# Patient Record
Sex: FEMALE | Race: ASIAN | NOT HISPANIC OR LATINO | Employment: OTHER | URBAN - METROPOLITAN AREA
[De-identification: names, ages, dates, MRNs, and addresses within clinical notes are randomized per-mention and may not be internally consistent; named-entity substitution may affect disease eponyms.]

---

## 2018-09-24 LAB
HBV SURFACE AG SERPL QL IA: NONREACTIVE
HIV 1+2 AB+HIV1 P24 AG SERPL QL IA: NEGATIVE
RUBELLA ANTIBODY IGG QUANTITATIVE: NORMAL IU/ML

## 2019-03-17 ENCOUNTER — APPOINTMENT (OUTPATIENT)
Dept: ULTRASOUND IMAGING | Facility: CLINIC | Age: 34
End: 2019-03-17
Attending: OBSTETRICS & GYNECOLOGY
Payer: COMMERCIAL

## 2019-03-17 ENCOUNTER — ANESTHESIA EVENT (OUTPATIENT)
Dept: SURGERY | Facility: CLINIC | Age: 34
End: 2019-03-17
Payer: COMMERCIAL

## 2019-03-17 ENCOUNTER — HOSPITAL ENCOUNTER (INPATIENT)
Facility: CLINIC | Age: 34
LOS: 3 days | Discharge: HOME | End: 2019-03-20
Attending: FAMILY MEDICINE | Admitting: OBSTETRICS & GYNECOLOGY
Payer: COMMERCIAL

## 2019-03-17 ENCOUNTER — ANESTHESIA (OUTPATIENT)
Dept: OBGYN | Facility: CLINIC | Age: 34
End: 2019-03-17

## 2019-03-17 ENCOUNTER — SURGERY (OUTPATIENT)
Age: 34
End: 2019-03-17
Payer: COMMERCIAL

## 2019-03-17 ENCOUNTER — ANESTHESIA EVENT (OUTPATIENT)
Dept: OBGYN | Facility: CLINIC | Age: 34
End: 2019-03-17

## 2019-03-17 ENCOUNTER — ANESTHESIA (OUTPATIENT)
Dept: SURGERY | Facility: CLINIC | Age: 34
End: 2019-03-17
Payer: COMMERCIAL

## 2019-03-17 ENCOUNTER — APPOINTMENT (OUTPATIENT)
Dept: MRI IMAGING | Facility: CLINIC | Age: 34
End: 2019-03-17
Attending: OBSTETRICS & GYNECOLOGY
Payer: COMMERCIAL

## 2019-03-17 DIAGNOSIS — Z98.891 S/P C-SECTION: ICD-10-CM

## 2019-03-17 DIAGNOSIS — N30.00 ACUTE CYSTITIS WITHOUT HEMATURIA: Primary | ICD-10-CM

## 2019-03-17 LAB
ABO + RH BLD: NORMAL
ABO + RH BLD: NORMAL
ALBUMIN SERPL-MCNC: 2.7 G/DL (ref 3.4–5)
ALBUMIN UR-MCNC: NEGATIVE MG/DL
ALP SERPL-CCNC: 127 U/L (ref 40–150)
ALT SERPL W P-5'-P-CCNC: 13 U/L (ref 0–50)
AMPHETAMINES UR QL SCN: NEGATIVE
ANION GAP SERPL CALCULATED.3IONS-SCNC: 10 MMOL/L (ref 3–14)
APPEARANCE UR: ABNORMAL
AST SERPL W P-5'-P-CCNC: 16 U/L (ref 0–45)
BACTERIA #/AREA URNS HPF: ABNORMAL /HPF
BILIRUB SERPL-MCNC: 0.3 MG/DL (ref 0.2–1.3)
BILIRUB UR QL STRIP: NEGATIVE
BLD GP AB SCN SERPL QL: NORMAL
BLD PROD TYP BPU: NORMAL
BLOOD BANK CMNT PATIENT-IMP: NORMAL
BUN SERPL-MCNC: 6 MG/DL (ref 7–30)
CALCIUM SERPL-MCNC: 8.5 MG/DL (ref 8.5–10.1)
CANNABINOIDS UR QL: NEGATIVE
CHLORIDE SERPL-SCNC: 107 MMOL/L (ref 94–109)
CO2 SERPL-SCNC: 21 MMOL/L (ref 20–32)
COCAINE UR QL: NEGATIVE
COLOR UR AUTO: YELLOW
CREAT SERPL-MCNC: 0.57 MG/DL (ref 0.52–1.04)
CREAT UR-MCNC: 53 MG/DL
ERYTHROCYTE [DISTWIDTH] IN BLOOD BY AUTOMATED COUNT: 15.4 % (ref 10–15)
GFR SERPL CREATININE-BSD FRML MDRD: >90 ML/MIN/{1.73_M2}
GLUCOSE BLDC GLUCOMTR-MCNC: 143 MG/DL (ref 70–99)
GLUCOSE SERPL-MCNC: 64 MG/DL (ref 70–99)
GLUCOSE UR STRIP-MCNC: NEGATIVE MG/DL
HCT VFR BLD AUTO: 32.8 % (ref 35–47)
HGB BLD-MCNC: 10.5 G/DL (ref 11.7–15.7)
HGB UR QL STRIP: NEGATIVE
KETONES UR STRIP-MCNC: 5 MG/DL
LEUKOCYTE ESTERASE UR QL STRIP: ABNORMAL
LIPASE SERPL-CCNC: 185 U/L (ref 73–393)
MCH RBC QN AUTO: 28.8 PG (ref 26.5–33)
MCHC RBC AUTO-ENTMCNC: 32 G/DL (ref 31.5–36.5)
MCV RBC AUTO: 90 FL (ref 78–100)
MUCOUS THREADS #/AREA URNS LPF: PRESENT /LPF
NITRATE UR QL: NEGATIVE
NUM BPU REQUESTED: 2
OPIATES UR QL SCN: NEGATIVE
PCP UR QL SCN: NEGATIVE
PH UR STRIP: 6 PH (ref 5–7)
PLATELET # BLD AUTO: 281 10E9/L (ref 150–450)
POTASSIUM SERPL-SCNC: 3.9 MMOL/L (ref 3.4–5.3)
PROT SERPL-MCNC: 7.3 G/DL (ref 6.8–8.8)
PROT UR-MCNC: 0.21 G/L
PROT/CREAT 24H UR: 0.39 G/G CR (ref 0–0.2)
RBC # BLD AUTO: 3.65 10E12/L (ref 3.8–5.2)
RBC #/AREA URNS AUTO: 2 /HPF (ref 0–2)
SODIUM SERPL-SCNC: 138 MMOL/L (ref 133–144)
SOURCE: ABNORMAL
SP GR UR STRIP: 1.01 (ref 1–1.03)
SPECIMEN EXP DATE BLD: NORMAL
SQUAMOUS #/AREA URNS AUTO: 4 /HPF (ref 0–1)
T PALLIDUM AB SER QL: NONREACTIVE
URATE SERPL-MCNC: 4.5 MG/DL (ref 2.6–6)
UROBILINOGEN UR STRIP-MCNC: 0 MG/DL (ref 0–2)
WBC # BLD AUTO: 12.6 10E9/L (ref 4–11)
WBC #/AREA URNS AUTO: 6 /HPF (ref 0–5)

## 2019-03-17 PROCEDURE — G0463 HOSPITAL OUTPT CLINIC VISIT: HCPCS

## 2019-03-17 PROCEDURE — 86850 RBC ANTIBODY SCREEN: CPT | Performed by: FAMILY MEDICINE

## 2019-03-17 PROCEDURE — 83690 ASSAY OF LIPASE: CPT | Performed by: OBSTETRICS & GYNECOLOGY

## 2019-03-17 PROCEDURE — 81001 URINALYSIS AUTO W/SCOPE: CPT | Performed by: FAMILY MEDICINE

## 2019-03-17 PROCEDURE — 87086 URINE CULTURE/COLONY COUNT: CPT | Performed by: OBSTETRICS & GYNECOLOGY

## 2019-03-17 PROCEDURE — 86901 BLOOD TYPING SEROLOGIC RH(D): CPT | Performed by: FAMILY MEDICINE

## 2019-03-17 PROCEDURE — 76819 FETAL BIOPHYS PROFIL W/O NST: CPT

## 2019-03-17 PROCEDURE — 0064U ANTB TP TOTAL&RPR IA QUAL: CPT | Performed by: OBSTETRICS & GYNECOLOGY

## 2019-03-17 PROCEDURE — 76816 OB US FOLLOW-UP PER FETUS: CPT

## 2019-03-17 PROCEDURE — 58611 LIGATE OVIDUCT(S) ADD-ON: CPT | Performed by: OBSTETRICS & GYNECOLOGY

## 2019-03-17 PROCEDURE — 88307 TISSUE EXAM BY PATHOLOGIST: CPT | Performed by: OBSTETRICS & GYNECOLOGY

## 2019-03-17 PROCEDURE — 0UT50ZZ RESECTION OF RIGHT FALLOPIAN TUBE, OPEN APPROACH: ICD-10-PCS | Performed by: OBSTETRICS & GYNECOLOGY

## 2019-03-17 PROCEDURE — 86923 COMPATIBILITY TEST ELECTRIC: CPT | Performed by: FAMILY MEDICINE

## 2019-03-17 PROCEDURE — 25000128 H RX IP 250 OP 636: Performed by: FAMILY MEDICINE

## 2019-03-17 PROCEDURE — 59514 CESAREAN DELIVERY ONLY: CPT | Performed by: OBSTETRICS & GYNECOLOGY

## 2019-03-17 PROCEDURE — 25000128 H RX IP 250 OP 636: Performed by: OBSTETRICS & GYNECOLOGY

## 2019-03-17 PROCEDURE — 86900 BLOOD TYPING SEROLOGIC ABO: CPT | Performed by: FAMILY MEDICINE

## 2019-03-17 PROCEDURE — 37000008 ZZH ANESTHESIA TECHNICAL FEE, 1ST 30 MIN: Performed by: OBSTETRICS & GYNECOLOGY

## 2019-03-17 PROCEDURE — 71000014 ZZH RECOVERY PHASE 1 LEVEL 2 FIRST HR: Performed by: OBSTETRICS & GYNECOLOGY

## 2019-03-17 PROCEDURE — 36000058 ZZH SURGERY LEVEL 3 EA 15 ADDTL MIN: Performed by: OBSTETRICS & GYNECOLOGY

## 2019-03-17 PROCEDURE — 12000000 ZZH R&B MED SURG/OB

## 2019-03-17 PROCEDURE — 84156 ASSAY OF PROTEIN URINE: CPT | Performed by: FAMILY MEDICINE

## 2019-03-17 PROCEDURE — 76700 US EXAM ABDOM COMPLETE: CPT

## 2019-03-17 PROCEDURE — 0UN90ZZ RELEASE UTERUS, OPEN APPROACH: ICD-10-PCS | Performed by: OBSTETRICS & GYNECOLOGY

## 2019-03-17 PROCEDURE — 25000128 H RX IP 250 OP 636

## 2019-03-17 PROCEDURE — 87088 URINE BACTERIA CULTURE: CPT | Performed by: OBSTETRICS & GYNECOLOGY

## 2019-03-17 PROCEDURE — 88302 TISSUE EXAM BY PATHOLOGIST: CPT | Performed by: OBSTETRICS & GYNECOLOGY

## 2019-03-17 PROCEDURE — 80053 COMPREHEN METABOLIC PANEL: CPT | Performed by: OBSTETRICS & GYNECOLOGY

## 2019-03-17 PROCEDURE — 88307 TISSUE EXAM BY PATHOLOGIST: CPT | Mod: 26 | Performed by: OBSTETRICS & GYNECOLOGY

## 2019-03-17 PROCEDURE — 88302 TISSUE EXAM BY PATHOLOGIST: CPT | Mod: 26 | Performed by: OBSTETRICS & GYNECOLOGY

## 2019-03-17 PROCEDURE — 00000146 ZZHCL STATISTIC GLUCOSE BY METER IP

## 2019-03-17 PROCEDURE — 25000128 H RX IP 250 OP 636: Performed by: NURSE ANESTHETIST, CERTIFIED REGISTERED

## 2019-03-17 PROCEDURE — 84550 ASSAY OF BLOOD/URIC ACID: CPT | Performed by: OBSTETRICS & GYNECOLOGY

## 2019-03-17 PROCEDURE — 59514 CESAREAN DELIVERY ONLY: CPT | Mod: 80 | Performed by: OBSTETRICS & GYNECOLOGY

## 2019-03-17 PROCEDURE — 25000132 ZZH RX MED GY IP 250 OP 250 PS 637: Performed by: OBSTETRICS & GYNECOLOGY

## 2019-03-17 PROCEDURE — 74181 MRI ABDOMEN W/O CONTRAST: CPT

## 2019-03-17 PROCEDURE — 58611 LIGATE OVIDUCT(S) ADD-ON: CPT | Mod: 80 | Performed by: OBSTETRICS & GYNECOLOGY

## 2019-03-17 PROCEDURE — 0UB60ZZ EXCISION OF LEFT FALLOPIAN TUBE, OPEN APPROACH: ICD-10-PCS | Performed by: OBSTETRICS & GYNECOLOGY

## 2019-03-17 PROCEDURE — 25800030 ZZH RX IP 258 OP 636: Performed by: OBSTETRICS & GYNECOLOGY

## 2019-03-17 PROCEDURE — 0DNU0ZZ RELEASE OMENTUM, OPEN APPROACH: ICD-10-PCS | Performed by: OBSTETRICS & GYNECOLOGY

## 2019-03-17 PROCEDURE — 27210794 ZZH OR GENERAL SUPPLY STERILE: Performed by: OBSTETRICS & GYNECOLOGY

## 2019-03-17 PROCEDURE — 25000125 ZZHC RX 250: Performed by: OBSTETRICS & GYNECOLOGY

## 2019-03-17 PROCEDURE — 85027 COMPLETE CBC AUTOMATED: CPT | Performed by: OBSTETRICS & GYNECOLOGY

## 2019-03-17 PROCEDURE — 87186 SC STD MICRODIL/AGAR DIL: CPT | Performed by: OBSTETRICS & GYNECOLOGY

## 2019-03-17 PROCEDURE — 37000009 ZZH ANESTHESIA TECHNICAL FEE, EACH ADDTL 15 MIN: Performed by: OBSTETRICS & GYNECOLOGY

## 2019-03-17 PROCEDURE — 25000125 ZZHC RX 250: Performed by: NURSE ANESTHETIST, CERTIFIED REGISTERED

## 2019-03-17 PROCEDURE — 36000056 ZZH SURGERY LEVEL 3 1ST 30 MIN: Performed by: OBSTETRICS & GYNECOLOGY

## 2019-03-17 PROCEDURE — 80307 DRUG TEST PRSMV CHEM ANLYZR: CPT | Performed by: FAMILY MEDICINE

## 2019-03-17 RX ORDER — CEFTRIAXONE 1 G/1
1 INJECTION, POWDER, FOR SOLUTION INTRAMUSCULAR; INTRAVENOUS EVERY 24 HOURS
Status: DISCONTINUED | OUTPATIENT
Start: 2019-03-18 | End: 2019-03-18

## 2019-03-17 RX ORDER — HYDROCORTISONE 2.5 %
CREAM (GRAM) TOPICAL 3 TIMES DAILY PRN
Status: DISCONTINUED | OUTPATIENT
Start: 2019-03-17 | End: 2019-03-20 | Stop reason: HOSPADM

## 2019-03-17 RX ORDER — HYDRALAZINE HYDROCHLORIDE 20 MG/ML
2.5-5 INJECTION INTRAMUSCULAR; INTRAVENOUS EVERY 10 MIN PRN
Status: DISCONTINUED | OUTPATIENT
Start: 2019-03-17 | End: 2019-03-17

## 2019-03-17 RX ORDER — BUPIVACAINE HYDROCHLORIDE 5 MG/ML
INJECTION, SOLUTION EPIDURAL; INTRACAUDAL PRN
Status: DISCONTINUED | OUTPATIENT
Start: 2019-03-17 | End: 2019-03-17

## 2019-03-17 RX ORDER — GLYCOPYRROLATE 0.2 MG/ML
INJECTION, SOLUTION INTRAMUSCULAR; INTRAVENOUS PRN
Status: DISCONTINUED | OUTPATIENT
Start: 2019-03-17 | End: 2019-03-17

## 2019-03-17 RX ORDER — MAGNESIUM HYDROXIDE 1200 MG/15ML
LIQUID ORAL PRN
Status: DISCONTINUED | OUTPATIENT
Start: 2019-03-17 | End: 2019-03-17

## 2019-03-17 RX ORDER — LANOLIN 100 %
OINTMENT (GRAM) TOPICAL
Status: DISCONTINUED | OUTPATIENT
Start: 2019-03-17 | End: 2019-03-20 | Stop reason: HOSPADM

## 2019-03-17 RX ORDER — DIPHENHYDRAMINE HYDROCHLORIDE 50 MG/ML
25 INJECTION INTRAMUSCULAR; INTRAVENOUS EVERY 6 HOURS PRN
Status: DISCONTINUED | OUTPATIENT
Start: 2019-03-17 | End: 2019-03-20 | Stop reason: HOSPADM

## 2019-03-17 RX ORDER — CEFAZOLIN SODIUM 2 G/100ML
2 INJECTION, SOLUTION INTRAVENOUS
Status: COMPLETED | OUTPATIENT
Start: 2019-03-17 | End: 2019-03-17

## 2019-03-17 RX ORDER — MORPHINE SULFATE 1 MG/ML
INJECTION, SOLUTION EPIDURAL; INTRATHECAL; INTRAVENOUS PRN
Status: DISCONTINUED | OUTPATIENT
Start: 2019-03-17 | End: 2019-03-17

## 2019-03-17 RX ORDER — METHYLERGONOVINE MALEATE 0.2 MG/1
200 TABLET ORAL EVERY 6 HOURS SCHEDULED
Status: DISCONTINUED | OUTPATIENT
Start: 2019-03-17 | End: 2019-03-17

## 2019-03-17 RX ORDER — MISOPROSTOL 200 UG/1
TABLET ORAL
Status: DISCONTINUED
Start: 2019-03-17 | End: 2019-03-17 | Stop reason: HOSPADM

## 2019-03-17 RX ORDER — METHYLERGONOVINE MALEATE 0.2 MG/ML
INJECTION INTRAVENOUS
Status: DISCONTINUED
Start: 2019-03-17 | End: 2019-03-17 | Stop reason: WASHOUT

## 2019-03-17 RX ORDER — AMOXICILLIN 250 MG
2 CAPSULE ORAL 2 TIMES DAILY PRN
Status: DISCONTINUED | OUTPATIENT
Start: 2019-03-17 | End: 2019-03-20 | Stop reason: HOSPADM

## 2019-03-17 RX ORDER — OXYCODONE HYDROCHLORIDE 5 MG/1
5-10 TABLET ORAL
Status: DISCONTINUED | OUTPATIENT
Start: 2019-03-17 | End: 2019-03-20 | Stop reason: HOSPADM

## 2019-03-17 RX ORDER — DEXTROSE, SODIUM CHLORIDE, SODIUM LACTATE, POTASSIUM CHLORIDE, AND CALCIUM CHLORIDE 5; .6; .31; .03; .02 G/100ML; G/100ML; G/100ML; G/100ML; G/100ML
INJECTION, SOLUTION INTRAVENOUS CONTINUOUS
Status: DISCONTINUED | OUTPATIENT
Start: 2019-03-17 | End: 2019-03-20 | Stop reason: HOSPADM

## 2019-03-17 RX ORDER — FENTANYL CITRATE 50 UG/ML
100 INJECTION, SOLUTION INTRAMUSCULAR; INTRAVENOUS
Status: DISCONTINUED | OUTPATIENT
Start: 2019-03-17 | End: 2019-03-17

## 2019-03-17 RX ORDER — FENTANYL CITRATE 50 UG/ML
INJECTION, SOLUTION INTRAMUSCULAR; INTRAVENOUS
Status: COMPLETED
Start: 2019-03-17 | End: 2019-03-17

## 2019-03-17 RX ORDER — OXYTOCIN/0.9 % SODIUM CHLORIDE 30/500 ML
340 PLASTIC BAG, INJECTION (ML) INTRAVENOUS CONTINUOUS PRN
Status: DISCONTINUED | OUTPATIENT
Start: 2019-03-17 | End: 2019-03-20 | Stop reason: HOSPADM

## 2019-03-17 RX ORDER — FENTANYL CITRATE 50 UG/ML
50 INJECTION, SOLUTION INTRAMUSCULAR; INTRAVENOUS ONCE
Status: COMPLETED | OUTPATIENT
Start: 2019-03-17 | End: 2019-03-17

## 2019-03-17 RX ORDER — HYDROMORPHONE HYDROCHLORIDE 1 MG/ML
0.5 INJECTION, SOLUTION INTRAMUSCULAR; INTRAVENOUS; SUBCUTANEOUS
Status: DISCONTINUED | OUTPATIENT
Start: 2019-03-17 | End: 2019-03-17

## 2019-03-17 RX ORDER — CITRIC ACID/SODIUM CITRATE 334-500MG
30 SOLUTION, ORAL ORAL
Status: COMPLETED | OUTPATIENT
Start: 2019-03-17 | End: 2019-03-17

## 2019-03-17 RX ORDER — BISACODYL 10 MG
10 SUPPOSITORY, RECTAL RECTAL DAILY PRN
Status: DISCONTINUED | OUTPATIENT
Start: 2019-03-19 | End: 2019-03-20 | Stop reason: HOSPADM

## 2019-03-17 RX ORDER — FENTANYL CITRATE 50 UG/ML
25-50 INJECTION, SOLUTION INTRAMUSCULAR; INTRAVENOUS
Status: DISCONTINUED | OUTPATIENT
Start: 2019-03-17 | End: 2019-03-17

## 2019-03-17 RX ORDER — ONDANSETRON 2 MG/ML
4 INJECTION INTRAMUSCULAR; INTRAVENOUS EVERY 30 MIN PRN
Status: DISCONTINUED | OUTPATIENT
Start: 2019-03-17 | End: 2019-03-17

## 2019-03-17 RX ORDER — SODIUM CHLORIDE, SODIUM LACTATE, POTASSIUM CHLORIDE, CALCIUM CHLORIDE 600; 310; 30; 20 MG/100ML; MG/100ML; MG/100ML; MG/100ML
INJECTION, SOLUTION INTRAVENOUS CONTINUOUS
Status: DISCONTINUED | OUTPATIENT
Start: 2019-03-17 | End: 2019-03-17

## 2019-03-17 RX ORDER — SIMETHICONE 80 MG
80 TABLET,CHEWABLE ORAL 4 TIMES DAILY PRN
Status: DISCONTINUED | OUTPATIENT
Start: 2019-03-17 | End: 2019-03-20 | Stop reason: HOSPADM

## 2019-03-17 RX ORDER — SODIUM CHLORIDE, SODIUM LACTATE, POTASSIUM CHLORIDE, CALCIUM CHLORIDE 600; 310; 30; 20 MG/100ML; MG/100ML; MG/100ML; MG/100ML
INJECTION, SOLUTION INTRAVENOUS CONTINUOUS
Status: DISCONTINUED | OUTPATIENT
Start: 2019-03-17 | End: 2019-03-17 | Stop reason: HOSPADM

## 2019-03-17 RX ORDER — CARBOPROST TROMETHAMINE 250 UG/ML
250 INJECTION, SOLUTION INTRAMUSCULAR
Status: DISCONTINUED | OUTPATIENT
Start: 2019-03-17 | End: 2019-03-20 | Stop reason: HOSPADM

## 2019-03-17 RX ORDER — METHYLERGONOVINE MALEATE 0.2 MG/ML
200 INJECTION INTRAVENOUS
Status: DISCONTINUED | OUTPATIENT
Start: 2019-03-17 | End: 2019-03-20 | Stop reason: HOSPADM

## 2019-03-17 RX ORDER — OXYTOCIN/0.9 % SODIUM CHLORIDE 30/500 ML
PLASTIC BAG, INJECTION (ML) INTRAVENOUS PRN
Status: DISCONTINUED | OUTPATIENT
Start: 2019-03-17 | End: 2019-03-17

## 2019-03-17 RX ORDER — LIDOCAINE 40 MG/G
CREAM TOPICAL
Status: DISCONTINUED | OUTPATIENT
Start: 2019-03-17 | End: 2019-03-20 | Stop reason: HOSPADM

## 2019-03-17 RX ORDER — DIMENHYDRINATE 50 MG/ML
25 INJECTION, SOLUTION INTRAMUSCULAR; INTRAVENOUS
Status: DISCONTINUED | OUTPATIENT
Start: 2019-03-17 | End: 2019-03-17

## 2019-03-17 RX ORDER — OXYTOCIN 10 [USP'U]/ML
10 INJECTION, SOLUTION INTRAMUSCULAR; INTRAVENOUS
Status: DISCONTINUED | OUTPATIENT
Start: 2019-03-17 | End: 2019-03-20 | Stop reason: HOSPADM

## 2019-03-17 RX ORDER — MISOPROSTOL 200 UG/1
800 TABLET ORAL ONCE
Status: DISCONTINUED | OUTPATIENT
Start: 2019-03-17 | End: 2019-03-17

## 2019-03-17 RX ORDER — BETAMETHASONE SODIUM PHOSPHATE AND BETAMETHASONE ACETATE 3; 3 MG/ML; MG/ML
12 INJECTION, SUSPENSION INTRA-ARTICULAR; INTRALESIONAL; INTRAMUSCULAR; SOFT TISSUE DAILY
Status: DISCONTINUED | OUTPATIENT
Start: 2019-03-17 | End: 2019-03-17

## 2019-03-17 RX ORDER — KETAMINE HYDROCHLORIDE 10 MG/ML
INJECTION, SOLUTION INTRAMUSCULAR; INTRAVENOUS PRN
Status: DISCONTINUED | OUTPATIENT
Start: 2019-03-17 | End: 2019-03-17

## 2019-03-17 RX ORDER — KETOROLAC TROMETHAMINE 30 MG/ML
30 INJECTION, SOLUTION INTRAMUSCULAR; INTRAVENOUS EVERY 6 HOURS
Status: COMPLETED | OUTPATIENT
Start: 2019-03-17 | End: 2019-03-18

## 2019-03-17 RX ORDER — FENTANYL CITRATE 50 UG/ML
INJECTION, SOLUTION INTRAMUSCULAR; INTRAVENOUS PRN
Status: DISCONTINUED | OUTPATIENT
Start: 2019-03-17 | End: 2019-03-17

## 2019-03-17 RX ORDER — DIPHENHYDRAMINE HCL 25 MG
25 CAPSULE ORAL EVERY 6 HOURS PRN
Status: DISCONTINUED | OUTPATIENT
Start: 2019-03-17 | End: 2019-03-20 | Stop reason: HOSPADM

## 2019-03-17 RX ORDER — ONDANSETRON 2 MG/ML
4 INJECTION INTRAMUSCULAR; INTRAVENOUS EVERY 6 HOURS PRN
Status: DISCONTINUED | OUTPATIENT
Start: 2019-03-17 | End: 2019-03-20 | Stop reason: HOSPADM

## 2019-03-17 RX ORDER — ONDANSETRON 4 MG/1
4 TABLET, ORALLY DISINTEGRATING ORAL EVERY 30 MIN PRN
Status: DISCONTINUED | OUTPATIENT
Start: 2019-03-17 | End: 2019-03-17

## 2019-03-17 RX ORDER — ONDANSETRON 2 MG/ML
4 INJECTION INTRAMUSCULAR; INTRAVENOUS EVERY 6 HOURS PRN
Status: DISCONTINUED | OUTPATIENT
Start: 2019-03-17 | End: 2019-03-17

## 2019-03-17 RX ORDER — CEFAZOLIN SODIUM 1 G/3ML
1 INJECTION, POWDER, FOR SOLUTION INTRAMUSCULAR; INTRAVENOUS SEE ADMIN INSTRUCTIONS
Status: DISCONTINUED | OUTPATIENT
Start: 2019-03-17 | End: 2019-03-17 | Stop reason: HOSPADM

## 2019-03-17 RX ORDER — HYDROMORPHONE HYDROCHLORIDE 1 MG/ML
.3-.5 INJECTION, SOLUTION INTRAMUSCULAR; INTRAVENOUS; SUBCUTANEOUS EVERY 5 MIN PRN
Status: DISCONTINUED | OUTPATIENT
Start: 2019-03-17 | End: 2019-03-17

## 2019-03-17 RX ORDER — ACETAMINOPHEN 325 MG/1
650 TABLET ORAL EVERY 4 HOURS PRN
Status: DISCONTINUED | OUTPATIENT
Start: 2019-03-20 | End: 2019-03-20 | Stop reason: HOSPADM

## 2019-03-17 RX ORDER — NALOXONE HYDROCHLORIDE 0.4 MG/ML
.1-.4 INJECTION, SOLUTION INTRAMUSCULAR; INTRAVENOUS; SUBCUTANEOUS
Status: ACTIVE | OUTPATIENT
Start: 2019-03-17 | End: 2019-03-18

## 2019-03-17 RX ORDER — LIDOCAINE 40 MG/G
CREAM TOPICAL
Status: DISCONTINUED | OUTPATIENT
Start: 2019-03-17 | End: 2019-03-17

## 2019-03-17 RX ORDER — FENTANYL CITRATE 50 UG/ML
100 INJECTION, SOLUTION INTRAMUSCULAR; INTRAVENOUS ONCE
Status: COMPLETED | OUTPATIENT
Start: 2019-03-17 | End: 2019-03-17

## 2019-03-17 RX ORDER — ACETAMINOPHEN 325 MG/1
975 TABLET ORAL EVERY 8 HOURS
Status: COMPLETED | OUTPATIENT
Start: 2019-03-17 | End: 2019-03-20

## 2019-03-17 RX ORDER — DEXAMETHASONE SODIUM PHOSPHATE 4 MG/ML
INJECTION, SOLUTION INTRA-ARTICULAR; INTRALESIONAL; INTRAMUSCULAR; INTRAVENOUS; SOFT TISSUE PRN
Status: DISCONTINUED | OUTPATIENT
Start: 2019-03-17 | End: 2019-03-17

## 2019-03-17 RX ORDER — OXYTOCIN/0.9 % SODIUM CHLORIDE 30/500 ML
100 PLASTIC BAG, INJECTION (ML) INTRAVENOUS CONTINUOUS
Status: DISCONTINUED | OUTPATIENT
Start: 2019-03-17 | End: 2019-03-20 | Stop reason: HOSPADM

## 2019-03-17 RX ORDER — CARBOPROST TROMETHAMINE 250 UG/ML
250 INJECTION, SOLUTION INTRAMUSCULAR ONCE
Status: DISCONTINUED | OUTPATIENT
Start: 2019-03-17 | End: 2019-03-17

## 2019-03-17 RX ORDER — IBUPROFEN 800 MG/1
800 TABLET, FILM COATED ORAL EVERY 6 HOURS PRN
Status: DISCONTINUED | OUTPATIENT
Start: 2019-03-18 | End: 2019-03-20 | Stop reason: HOSPADM

## 2019-03-17 RX ORDER — CARBOPROST TROMETHAMINE 250 UG/ML
INJECTION, SOLUTION INTRAMUSCULAR
Status: DISCONTINUED
Start: 2019-03-17 | End: 2019-03-17 | Stop reason: WASHOUT

## 2019-03-17 RX ORDER — MISOPROSTOL 200 UG/1
800 TABLET ORAL
Status: DISCONTINUED | OUTPATIENT
Start: 2019-03-17 | End: 2019-03-20 | Stop reason: HOSPADM

## 2019-03-17 RX ORDER — NALOXONE HYDROCHLORIDE 0.4 MG/ML
.1-.4 INJECTION, SOLUTION INTRAMUSCULAR; INTRAVENOUS; SUBCUTANEOUS
Status: DISCONTINUED | OUTPATIENT
Start: 2019-03-17 | End: 2019-03-20 | Stop reason: HOSPADM

## 2019-03-17 RX ORDER — HYDROXYZINE HYDROCHLORIDE 25 MG/ML
50 INJECTION, SOLUTION INTRAMUSCULAR ONCE
Status: COMPLETED | OUTPATIENT
Start: 2019-03-17 | End: 2019-03-17

## 2019-03-17 RX ORDER — AMOXICILLIN 250 MG
1 CAPSULE ORAL 2 TIMES DAILY PRN
Status: DISCONTINUED | OUTPATIENT
Start: 2019-03-17 | End: 2019-03-20 | Stop reason: HOSPADM

## 2019-03-17 RX ORDER — NALOXONE HYDROCHLORIDE 0.4 MG/ML
.1-.4 INJECTION, SOLUTION INTRAMUSCULAR; INTRAVENOUS; SUBCUTANEOUS
Status: DISCONTINUED | OUTPATIENT
Start: 2019-03-17 | End: 2019-03-17

## 2019-03-17 RX ORDER — CEFTRIAXONE 1 G/1
1 INJECTION, POWDER, FOR SOLUTION INTRAMUSCULAR; INTRAVENOUS EVERY 24 HOURS
Status: DISCONTINUED | OUTPATIENT
Start: 2019-03-17 | End: 2019-03-17

## 2019-03-17 RX ADMIN — FENTANYL CITRATE 50 MCG: 50 INJECTION, SOLUTION INTRAMUSCULAR; INTRAVENOUS at 13:11

## 2019-03-17 RX ADMIN — BUPIVACAINE HYDROCHLORIDE 13.5 MG: 5 INJECTION, SOLUTION EPIDURAL; INTRACAUDAL at 12:30

## 2019-03-17 RX ADMIN — FENTANYL CITRATE 50 MCG: 50 INJECTION, SOLUTION INTRAMUSCULAR; INTRAVENOUS at 13:19

## 2019-03-17 RX ADMIN — SODIUM CHLORIDE, POTASSIUM CHLORIDE, SODIUM LACTATE AND CALCIUM CHLORIDE: 600; 310; 30; 20 INJECTION, SOLUTION INTRAVENOUS at 12:28

## 2019-03-17 RX ADMIN — SODIUM CITRATE AND CITRIC ACID MONOHYDRATE 30 ML: 500; 334 SOLUTION ORAL at 12:19

## 2019-03-17 RX ADMIN — OXYTOCIN-SODIUM CHLORIDE 0.9% IV SOLN 30 UNIT/500ML 1 ML: 30-0.9/5 SOLUTION at 13:02

## 2019-03-17 RX ADMIN — ONDANSETRON 4 MG: 2 INJECTION INTRAMUSCULAR; INTRAVENOUS at 12:31

## 2019-03-17 RX ADMIN — MIDAZOLAM 1 MG: 1 INJECTION INTRAMUSCULAR; INTRAVENOUS at 13:01

## 2019-03-17 RX ADMIN — DEXAMETHASONE SODIUM PHOSPHATE 4 MG: 4 INJECTION, SOLUTION INTRA-ARTICULAR; INTRALESIONAL; INTRAMUSCULAR; INTRAVENOUS; SOFT TISSUE at 12:31

## 2019-03-17 RX ADMIN — ACETAMINOPHEN 975 MG: 325 TABLET, FILM COATED ORAL at 15:26

## 2019-03-17 RX ADMIN — SENNOSIDES AND DOCUSATE SODIUM 2 TABLET: 8.6; 5 TABLET ORAL at 21:28

## 2019-03-17 RX ADMIN — KETOROLAC TROMETHAMINE 30 MG: 30 INJECTION, SOLUTION INTRAMUSCULAR; INTRAVENOUS at 21:28

## 2019-03-17 RX ADMIN — OXYCODONE HYDROCHLORIDE 10 MG: 5 TABLET ORAL at 15:26

## 2019-03-17 RX ADMIN — ONDANSETRON 4 MG: 2 INJECTION INTRAMUSCULAR; INTRAVENOUS at 07:39

## 2019-03-17 RX ADMIN — SODIUM CHLORIDE, SODIUM LACTATE, POTASSIUM CHLORIDE, CALCIUM CHLORIDE AND DEXTROSE MONOHYDRATE: 5; 600; 310; 30; 20 INJECTION, SOLUTION INTRAVENOUS at 20:47

## 2019-03-17 RX ADMIN — HYDROXYZINE HYDROCHLORIDE 50 MG: 25 INJECTION, SOLUTION INTRAMUSCULAR at 11:32

## 2019-03-17 RX ADMIN — ACETAMINOPHEN 975 MG: 325 TABLET, FILM COATED ORAL at 23:39

## 2019-03-17 RX ADMIN — OXYTOCIN-SODIUM CHLORIDE 0.9% IV SOLN 30 UNIT/500ML 100 ML/HR: 30-0.9/5 SOLUTION at 16:12

## 2019-03-17 RX ADMIN — CEFTRIAXONE SODIUM 1 G: 1 INJECTION, POWDER, FOR SOLUTION INTRAMUSCULAR; INTRAVENOUS at 08:55

## 2019-03-17 RX ADMIN — BETAMETHASONE ACETATE AND BETAMETHASONE SODIUM PHOSPHATE 12 MG: 3; 3 INJECTION, SUSPENSION INTRA-ARTICULAR; INTRALESIONAL; INTRAMUSCULAR; SOFT TISSUE at 10:40

## 2019-03-17 RX ADMIN — MORPHINE SULFATE 0.2 MG: 1 INJECTION, SOLUTION EPIDURAL; INTRATHECAL; INTRAVENOUS at 12:30

## 2019-03-17 RX ADMIN — OXYCODONE HYDROCHLORIDE 5 MG: 5 TABLET ORAL at 23:39

## 2019-03-17 RX ADMIN — FENTANYL CITRATE 100 MCG: 50 INJECTION, SOLUTION INTRAMUSCULAR; INTRAVENOUS at 10:37

## 2019-03-17 RX ADMIN — FENTANYL CITRATE 100 MCG: 50 INJECTION, SOLUTION INTRAMUSCULAR; INTRAVENOUS at 08:14

## 2019-03-17 RX ADMIN — SODIUM CHLORIDE, POTASSIUM CHLORIDE, SODIUM LACTATE AND CALCIUM CHLORIDE 1000 ML: 600; 310; 30; 20 INJECTION, SOLUTION INTRAVENOUS at 07:39

## 2019-03-17 RX ADMIN — KETAMINE HYDROCHLORIDE 20 MG: 10 INJECTION, SOLUTION INTRAMUSCULAR; INTRAVENOUS at 13:01

## 2019-03-17 RX ADMIN — OXYCODONE HYDROCHLORIDE 10 MG: 5 TABLET ORAL at 20:17

## 2019-03-17 RX ADMIN — SODIUM CHLORIDE, POTASSIUM CHLORIDE, SODIUM LACTATE AND CALCIUM CHLORIDE: 600; 310; 30; 20 INJECTION, SOLUTION INTRAVENOUS at 12:50

## 2019-03-17 RX ADMIN — FENTANYL CITRATE 50 MCG: 50 INJECTION, SOLUTION INTRAMUSCULAR; INTRAVENOUS at 06:54

## 2019-03-17 RX ADMIN — GLYCOPYRROLATE 0.2 MG: 0.2 INJECTION, SOLUTION INTRAMUSCULAR; INTRAVENOUS at 12:31

## 2019-03-17 RX ADMIN — SODIUM CHLORIDE, POTASSIUM CHLORIDE, SODIUM LACTATE AND CALCIUM CHLORIDE: 600; 310; 30; 20 INJECTION, SOLUTION INTRAVENOUS at 10:26

## 2019-03-17 RX ADMIN — PHENYLEPHRINE HYDROCHLORIDE 100 MCG: 10 INJECTION, SOLUTION INTRAMUSCULAR; INTRAVENOUS; SUBCUTANEOUS at 12:42

## 2019-03-17 RX ADMIN — FENTANYL CITRATE 100 MCG: 50 INJECTION, SOLUTION INTRAMUSCULAR; INTRAVENOUS at 09:29

## 2019-03-17 RX ADMIN — DIPHENHYDRAMINE HYDROCHLORIDE 25 MG: 50 INJECTION, SOLUTION INTRAMUSCULAR; INTRAVENOUS at 19:26

## 2019-03-17 RX ADMIN — OXYTOCIN-SODIUM CHLORIDE 0.9% IV SOLN 30 UNIT/500ML 199 ML: 30-0.9/5 SOLUTION at 13:50

## 2019-03-17 RX ADMIN — Medication 0.5 MG: at 11:27

## 2019-03-17 RX ADMIN — SODIUM CHLORIDE 750 ML: 900 IRRIGANT IRRIGATION at 13:30

## 2019-03-17 RX ADMIN — CEFAZOLIN SODIUM 2 G: 2 INJECTION, SOLUTION INTRAVENOUS at 12:28

## 2019-03-17 RX ADMIN — PHENYLEPHRINE HYDROCHLORIDE 100 MCG: 10 INJECTION, SOLUTION INTRAMUSCULAR; INTRAVENOUS; SUBCUTANEOUS at 12:52

## 2019-03-17 RX ADMIN — KETOROLAC TROMETHAMINE 30 MG: 30 INJECTION, SOLUTION INTRAMUSCULAR; INTRAVENOUS at 15:27

## 2019-03-17 SDOH — HEALTH STABILITY: MENTAL HEALTH: HOW OFTEN DO YOU HAVE A DRINK CONTAINING ALCOHOL?: NEVER

## 2019-03-17 ASSESSMENT — ENCOUNTER SYMPTOMS
STRIDOR: 0
DYSRHYTHMIAS: 0
SEIZURES: 0

## 2019-03-17 ASSESSMENT — MIFFLIN-ST. JEOR: SCORE: 1490.61

## 2019-03-17 ASSESSMENT — COPD QUESTIONNAIRES: COPD: 0

## 2019-03-17 ASSESSMENT — LIFESTYLE VARIABLES: TOBACCO_USE: 1

## 2019-03-17 NOTE — PROGRESS NOTES
"Data: Patient presented to [facility]: 3/17/2019  5:59 AM.  Reason for maternal/fetal assessment is right sided back pain which started 1800 on 3/16/19. Patient reports pain is \"sharp.\" States she was walking around [facility] for a few hours when pain intensified. Patient was brought in via ambulance directly from [facility].  Patient is a .  Prenatal record reviewed. Pregnancy  has been complicated by gestational diabetes, diet controlled; takes glyburide. Postprandial .   Gestational Age 34w5d. VSS. Fetal movement present. Patient denies uterine contractions, leaking of vaginal fluid/rupture of membranes, vaginal bleeding, abdominal pain, pelvic pressure, headache, visual disturbances, epigastric or URQ pain, significant edema. Support persons are present.     Action: Verbal consent for EFM. Triage assessment completed. Bill of rights reviewed.    Response: Patient verbalized agreement with plan. Will contact Dr Yaritza Cuellar with update and further orders.              "

## 2019-03-17 NOTE — OP NOTE
[facility]    OB Operative Note    Pre-operative diagnosis:  pregnancy @ 34w5d, History of  section x3, Prior classical, Prior vertical abdominal incision due to h/o extensive adhesions, RUQ abdominal and flank pain, Active labor, Elective sterilization, GDMA2   Post-operative diagnosis Same, Extensive omental and uterine adhesions   Procedure: Procedure(s):  Repeat classical  section via vertical midline skin incision  Right salpingectomy  Partial left salpingectomy  Lysis of extensive adhesions     Surgeon: Elaine Miner DO   Assistants(s): MD Dr. Talon John actively first assisted during this operation providing necessary retraction and exposure as well as maintaining hemostasis and a clear operative field. This was helpful in allowing the operation to proceed in a safe and expeditious manner.    Anesthesia: Spinal    Estimated blood loss: 569mL    Total IV fluids: (See anesthesia record)   Blood transfusion: No transfusion was given during surgery   Total urine output: (See anesthesia record)   Drains: None   Specimens:     ID Type Source Tests Collected by Time Destination   1 :  Cord blood Blood OR DOCUMENTATION ONLY Elaine Miner DO 3/17/2019  1:00 PM    2 :  Placenta Placenta SEE PROVIDERS ORDERS Elaine Miner DO 3/17/2019  1:03 PM    3 :  Tissue Umbilical Cord SEE PROVIDERS ORDERS Elaine Miner DO 3/17/2019  1:04 PM    4 :  Tissue Fallopian Tube, Right SEE PROVIDERS ORDERS Elaine Miner DO 3/17/2019  1:22 PM    5 : partial fallopian tube Tissue Fallopian Tube, Left SEE PROVIDERS ORDERS Elaine Miner DO 3/17/2019  1:23 PM       Complications: None   Condition: Infant stable  Mother stable, transfered to floor         Indication: Patient presented with a chief complaint of right flank pain that developed into right upper quadrant abdominal pain. UA showed a possible UTI and she was started on Rocephin IV. An abdominal ultrasound was  non-contributory. An abdominal MRI showed a normal appendix, possible nephrolithiasis at the right ureteropelvic junction. Her pain was difficult to manage with IV pain medications and she began to feel regular contractions. Her cervix changed from closed to fingertip to 1cm. Due to active labor with a history of 3 previous  sections including most recently in 2016, a classical  section via vertical midline skin incision, the decision was made to proceed with delivery by repeat classical  section. The patient had previously requested bilateral tubal ligation and continued to desire sterlization. Due to a history of extensive adhesions and post partum hemorrhage,  the patient was cross matched for 2 units, a Bakri balloon was made available, and general surgery was made aware of the patient's case. The case was also discussed with Urology who gave recommendations for CT postpartum if the patient's right upper quadrant/flank pain continues after delivery; possible stent placement. The patient did receive Betamethasone x1 prior to delivery.      Findings: Extensive thick adhesions were noted from the anterior abdominal wall to the middle and lower anterior uterus. The omentum was adhered to the anterior abdominal fascia. A viable female  born at 1300 via a classical uterine incision weighing 5lbs 4oz and with APGARs of 7 and 7 at 1 and 5 minutes respectively. A nuchal cord was not noted at the time of delivery. Amniotic fluid was noted to be clear. Normal fallopian tubes and ovaries were noted. Clear urine output was noted in the catheter bag thorough and at the conclusion of the procedure.              DESCRIPTION OF OPERATIVE PROCEDURE: After insuring informed consent, the patient was taken to the operating room and spinal anesthesia was initiated.  The patient was then transferred to the dorsal supine position with left lateral tilt. The vagina and perineum were prepped.  A marcos  catheter was placed and set to bedside drainage. The abdomen was prepped and draped in a standard sterile fashion.  Anesthesia was determined to be adequate with an caroline clamp.    A vertical skin incision was made using a scalpel from below the umbilicus to 2cm above the superior aspect of the pubic bone. This incision was carried through to the level of the fascia.  The fascia was then incised using a scalpel and bovie device being careful to protect the omentum and bowel below. The metzenbaum scissors and bovie device were used to lyse the thick adhesions to the mid and lower anterior uterus. A retractor was then inserted and the vesicouterine peritoneum was identified, grasped with the pickups, and entered sharply with Metzenbaum scissors.  This incision was then extended laterally, and a bladder flap was created.  The bladder was retracted using a retractor.  A vertical uterine incision was made with the scalpel, then extended manually.  The retractor was then removed. The 's head was elevated and delivered atraumatically, followed by the 's body.  The nose and mouth of the  were suctioned and delayed cord clamping was performed. The umbilical cord was clamped x 2 and cut.  The  was taken to the radiant warmer to the awaiting nursery team.  Cord gasses and cord blood was obtained.     The placenta was then removed by cord traction without difficulty. The uterus was cleared of all clots and debris using ring forceps and a laparotomy sponge.  The uterine incision was repaired using 0 Vicryl in a running locking fashion starting at the superior apex and working toward the inferior apex. A second non-locking layer was then placed. Hemostasis of the right apex was obtained using 0-vicryl in a standard figure of eight fashion. The uterine closure was inspected. Any bleeding was made hemostatic with pressure to the anterior uterus with a laparotomy sponge.      The right fallopian tube was  grasped and elevated with a mary and using a LigaSure device, the mesosalpinx was sealed and  and the right tube excised.  The left tube was partially adhered to the left ovary. A partial salpingectomy was therefore performed using the Ligasure device removing approximately 3cm of fallopian tube at the midline.  A small amount of bleeding along the edge of the mesosalpinx was made hemostatic with the Ligasure device.     The uterus was returned to the abdomen. The abdomen was irrigated with warm sterile water. The gutters were cleared of clots. The uterine incision was inspected once again and a small area of serosal bleeding was made hemostatic with electrocautery. The omentum was extensively adhered to the fascia of the anterior wall. The omentum was cleared from the fascia using the Ligasure from the incision edge to 4cm lateral bilaterally to allow for fascia suture closure. The fascia and muscle were inspected for any areas of bleeding. None were found. The fascia was reapproximated using 0-looped PDS in a running fashion starting at the superior apex and ending at the inferior apex. The subcutaneous tissue was irrigated with warm water. A few small bleeding sites were cauterized using electrocautery. Hemostasis assured. The subcutaneous tissue was approximated with a running suture using 2-0 Vicryl. The skin was reapproximated using staples. A sterile dressing was placed. Lower uterine segment and vagina were cleared of clots and the fundus was noted to be firm.    The patient tolerated this procedure well.  All needle, sponge and instrument counts were correct times two.  The patient was taken to the recovery room in an awake and stable condition.       Elaine Miner,   OB/GYN

## 2019-03-17 NOTE — PROGRESS NOTES
19 0643   Provider Notification   Provider Name/Title Dr Cuellar   Method of Notification Phone   Request Evaluate - Remote     Dr Cuellar notified of MAXI patient. Arrival via ambulance from [facility]. Patient reports excruciating right sided back pain 9-10/10. Patient is a 33 yo   At 34 weeks 5 days GA. GDMA1. +CVA tenderness on right side. UA pending. Order received for 50 mg IV fentanyl STAT for pain. Will call and update when UA is resulted.

## 2019-03-17 NOTE — ANESTHESIA POSTPROCEDURE EVALUATION
Patient: Cynthia Magana    Procedure(s):  COMBINED  SECTION, TUBAL LIGATION    Diagnosis:pregnancy  Diagnosis Additional Information: Gestational age:  34w5d, Term gestation, Prior uterine scar, Elective sterilization     Anesthesia Type:  Spinal    Note:  Anesthesia Post Evaluation    Patient location during evaluation: PACU  Patient participation: Able to participate in evaluation but full recovery from regional anesthesia has not yet ocurrred but is anticipated to occur within 48 hours  Level of consciousness: awake  Pain management: adequate  Airway patency: patent  Cardiovascular status: acceptable  Respiratory status: acceptable  Hydration status: acceptable  PONV: controlled     Anesthetic complications: None          Last vitals:  Vitals:    19 1430 19 1446 19 1459   BP: 128/73 102/62 101/65   Resp:    Temp:   99.5  F (37.5  C)   SpO2: 96%  96%         Electronically Signed By: Evan Garber MD  2019  3:23 PM

## 2019-03-17 NOTE — PLAN OF CARE
Data: Cynthia Magana transferred to 442 via cart at 1600.   Action: Receiving unit notified of transfer: Yes. Patient and family notified of room change. Report given to VIJAY Dover at 1645. Belongings sent to receiving unit. Accompanied by Registered Nurse. Oriented patient to surroundings. Call light within reach. ID bands double-checked with receiving RN.  Response: Patient tolerated transfer and is stable.    Pt down to NICU to see infant prior to transfer to postpartum room. Pt accompanied by her mother and other family. FOB with other children and will be arriving this evening.

## 2019-03-17 NOTE — H&P
[facility] Labor and Delivery History and Physical    Cynthia Magana MRN# [id number]   Age: 34 year old YOB: 1985     Date of Admission:  3/17/2019    Primary OB care provider: Laina Salas MD , [facility] Gynecology  [facility] ([facility])           Chief Complaint:   Cynthia Magana is a 34 year old female who is 34w5d pregnant and being admitted for right flank pain, RUQ abdominal pain,  gestation @ 34w5d, h/o  section x3, h/o classical  section with vertical midline abdominal incision in  due to extensive adhesions in a previous .   Her first  section in  at 33 weeks was for IUGR, oligohydramnios, reverse end diastolic flow, and non-reactive fetal heart tracing remote from delivery.   Her second  section was a failed TOLAC @ 39 weeks.     This pregnancy has been complicated by Obesity, GDMA2 (on glyburide, non-compliance), Tobacco use. Reports she has been taking Glyburide 2.5mg BID but her sugars have not been under control. States she has a scheduled  with bilateral tubal ligation scheduled with her primary OB at 37 weeks.     [place]. Pain started yesterday evening at 1800. Admits to nausea and vomiting. No fever or chills or myalgias.           Pregnancy history:       OBSTETRIC HISTORY:    Obstetric History       T2      L4     SAB0   TAB0   Ectopic0   Multiple0   Live Births0       # Outcome Date GA Lbr Vadim/2nd Weight Sex Delivery Anes PTL Lv   8 Current            7 Para 2016     CS-Classical      6 Para 2013     CS-LTranv      5 Para      CS-LTranv      4 Term            3 Term            2             1                    EDC: Estimated Date of Delivery: 2019    Prenatal Labs:   Lab Results   Component Value Date    ABO B 2019    RH Pos 2019    AS Neg 2019    HEPBANG nonreactive 2018    HGB  10.5 (L) 2019       GBS Status:   No results found for: GBS, collected last prenatal visit with primary OB    Active Problem List  Patient Active Problem List   Diagnosis     S/P        Medication Prior to Admission  No medications prior to admission.   .        Maternal Past Medical History:     Past Medical History:   Diagnosis Date     Diabetes (H)     GDM (controlled with oral medications)                       Family History:   History reviewed. H/o diabetes. No other pertinent family history.              Social History:     Social History     Tobacco Use     Smoking status: Current Every Day Smoker     Smokeless tobacco: Never Used   Substance Use Topics     Alcohol use: No     Frequency: Never            Review of Systems:   The Review of Systems is negative other than noted in the HPI          Physical Exam:     Vitals were reviewed  19 0744 -- -- -- 106 bpm 115/72 24 -- -- -- -- -- NS   19 0654 -- -- -- -- -- -- -- -- -- 10 -- CS   19 0615 -- -- -- -- -- -- -- -- -- -- 84.8 kg (187 lb) TE   19 0600 98  F (36.7  C) -- -- 95 bpm 130/91 Abnormal  20 -- -- -- 9 -- TE         Constitutional:   awake, alert, cooperative,  appears stated age, appears very uncomfortable, writhing in bed   Abdomen. Mild to moderate tenderness RUQ, no notable CVA tenderness, gravid - appropriate for gestational age  Cervix:   Membranes: intact   Dilation: closed   Effacement: thick   Station:-3   Position: Posterior  Presentation:Cephalic  Fetal Heart Rate Tracing: Category 1  Tocometer: q 2-5 mins                 Component      Latest Ref Rng & Units 3/17/2019   Color Urine       Yellow   Appearance Urine       Slightly Cloudy   Glucose Urine      NEG:Negative mg/dL Negative   Bilirubin Urine      NEG:Negative Negative   Ketones Urine      NEG:Negative mg/dL 5 (A)   Specific Gravity Urine      1.003 - 1.035 1.010   Blood Urine      NEG:Negative Negative   pH Urine      5.0 - 7.0 pH 6.0    Protein Albumin Urine      NEG:Negative mg/dL Negative   Urobilinogen mg/dL      0.0 - 2.0 mg/dL 0.0   Nitrite Urine      NEG:Negative Negative   Leukocyte Esterase Urine      NEG:Negative Trace (A)   Source       Midstream Urine   WBC Urine      0 - 5 /HPF 6 (H)   RBC Urine      0 - 2 /HPF 2   Bacteria Urine      NEG:Negative /HPF Few (A)   Squamous Epithelial /HPF Urine      0 - 1 /HPF 4 (H)   Mucous Urine      NEG:Negative /LPF Present (A)   Sodium      133 - 144 mmol/L 138   Potassium      3.4 - 5.3 mmol/L 3.9   Chloride      94 - 109 mmol/L 107   Carbon Dioxide      20 - 32 mmol/L 21   Anion Gap      3 - 14 mmol/L 10   Glucose      70 - 99 mg/dL 64 (L)   Urea Nitrogen      7 - 30 mg/dL 6 (L)   Creatinine      0.52 - 1.04 mg/dL 0.57   GFR Estimate      >60 mL/min/1.73:m2 >90   GFR Estimate If Black      >60 mL/min/1.73:m2 >90   Calcium      8.5 - 10.1 mg/dL 8.5   Bilirubin Total      0.2 - 1.3 mg/dL 0.3   Albumin      3.4 - 5.0 g/dL 2.7 (L)   Protein Total      6.8 - 8.8 g/dL 7.3   Alkaline Phosphatase      40 - 150 U/L 127           Assessment:   Cynthia Magana is a 34w5d pregnant female admitted for observation with Right flank and RUQ pain, possible UTI (culture pending). Normal WBC, afebrile.  H/o  section x3  H/o Classical  section with vertical midline abdominal incision in 2016 due to extensive adhesions in a previous   Desire for sterilization  Obesity  GDMA2- on glyburide (poor compliance and control)  Elevated blood pressure x1 with urine Pr:Cr 0.39; serum labs nml        Plan:   Admit - see IP orders  Observation  NS 1L NS bolus  Rocephin 1 gram q 24 hours, await culture  Pain medication Fentanyl 50mcg IV  Stat Abdominal ultrasound with BPP, fetal ultrasound for growth, EUNICE and placenta  Consulted with General Surgery, MRI if appendix not seen on ultrasound   Monitor BPs  Betamethasone  Low threshold for delivery if evidence for active labor, abnormal fetal heart  beverley Miner, DO   OB/GYN

## 2019-03-17 NOTE — PROVIDER NOTIFICATION
03/17/19 1100   Provider Notification   Provider Name/Title Dr. Miner   Method of Notification In Department   Updated Dr. Miner on preliminary results of MRI as kidney stone. MD orders to strain urine. Continue current plan of IVF, pain medication, anti-emetics, continuous uterine and fetal monitoring. First dose of betamethasone given per MD orders. MD aware of irregular contractions on monitor, palpating mild. MD aware of unchanged SVE (still fingertip) prior to MRI.     Pt requesting more for pain control. MD orders for 50 mg IM Vistaril x1 and 0.5 mg IV Dilaudid q1h PRN. Orders received and initiated--POC reviewed with pt and her sister.

## 2019-03-17 NOTE — CONSULTS
Urology Note    Was asked by Dr. Miner to evaluate patient regarding possible right ureteral stone. Prior to my evaluation, decision was made to perform  section given ongoing labor. As a result, patient was not directly examined by me.    Will defer formal urology evaluation pending delivery. If still having pain in post-partum period, would recommend CT stone protocol to evaluate for size and location of any possible stone, and would consider subsequent treatment pending those findings.    Please call urology with questions or concerns.    Remi Pearson MD  Urology  [facility]  Clinic Phone [phone]

## 2019-03-17 NOTE — ANESTHESIA CARE TRANSFER NOTE
Patient: Cynthia Magana    Procedure(s):  COMBINED  SECTION, TUBAL LIGATION    Diagnosis: pregnancy  Diagnosis Additional Information: No value filed.    Anesthesia Type:   Spinal     Note:  Airway :Room Air  Patient transferred to:Labor and Delivery  Handoff Report: Identifed the Patient, Identified the Reponsible Provider, Reviewed the pertinent medical history, Discussed the surgical course, Reviewed Intra-OP anesthesia mangement and issues during anesthesia, Set expectations for post-procedure period and Allowed opportunity for questions and acknowledgement of understanding      Vitals: (Last set prior to Anesthesia Care Transfer)    CRNA VITALS  3/17/2019 1323 - 3/17/2019 1406      3/17/2019             Pulse:  104    SpO2:  99 %                Electronically Signed By: Dean Dennis Severson, APRN CRNA  2019  2:06 PM

## 2019-03-17 NOTE — PROVIDER NOTIFICATION
19 0719   Provider Notification   Provider Name/Title Dr. Miner   Method of Notification Electronic Page   Request Evaluate - Remote   Notification Reason Pain;Lab/Diagnostic Study   Updated Dr. Miner on arrival of pt (34w5d) . Hx of three prior c-sections. Pt sees [facility] OB's. Pt arrived via ambulance from the [facility] with severe upper/R abdominal and flank pain (rating 10/10) along with nausea and emesis. Per prior RN, SVE is closed. No vaginal bleeding or leaking of fluid. FHT's category I tracing, reactive, when on the monitor. Ctx occ with irritability--abdomen palpates soft. 50 mcg of IV Fentanyl given per Dr. Cuellar orders not helping the pain. Pt is requesting more pain medication. Pt is afebrile. BP slightly elevated. HR slightly tachycardic. UA results read to MD.    MD orders for CMP, lipase, bilirubin, CBC with plt, uric acid, urine protein. Orders for 4 mg Zofran IV q6h. Orders for IVF bolus 1 L of lactated ringers followed by LR continuously at 125 m/hr. MD states she is arriving at the hospital now and will come up to see pt before she decides what pain medication to order. POC reviewed with pt and family--all questions answered.

## 2019-03-17 NOTE — ANESTHESIA PROCEDURE NOTES
Peripheral nerve/Neuraxial procedure note : intrathecal  Pre-Procedure  Performed by Evan Garber MD  Referred by [facility]  Location: OR      Pre-Anesthestic Checklist: patient identified, IV checked, site marked, risks and benefits discussed, informed consent, monitors and equipment checked, pre-op evaluation and at physician/surgeon's request    Timeout  Correct Patient: Yes   Correct Procedure: Yes   Correct Site: Yes   Correct Laterality: Yes and N/A   Correct Position: Yes   Site Marked: Yes, N/A   .   Procedure Documentation  ASA 2  .    Procedure:    Intrathecal.  Insertion Site:L3-4  (midline approach)      Patient Prep;mask, sterile gloves, povidone-iodine 7.5% surgical scrub, patient draped.  .  Needle: Esdras tip Spinal Needle (gauge): 22  Spinal/LP Needle Length (inches): 3.5 # of attempts: 1 and # of redirects:  .       Assessment/Narrative  Paresthesias: No.  .  .  clear CSF fluid removed . Time Injected: 12:30

## 2019-03-17 NOTE — ANESTHESIA PREPROCEDURE EVALUATION
Anesthesia Pre-Procedure Evaluation    Patient: Cynthia Magana   MRN: [id number] : 1985          Preoperative Diagnosis: pregnancy    Procedure(s):  COMBINED  SECTION, TUBAL LIGATION    Past Medical History:   Diagnosis Date     Diabetes (H)     GDM (controlled with oral medications)     Past Surgical History:   Procedure Laterality Date      SECTION       Anesthesia Evaluation     . Pt has had prior anesthetic. Type: General and Regional           ROS/MED HX    ENT/Pulmonary:     (+)DE risk factors obese, tobacco use, Current use , . .   (-) asthma, COPD and recent URI   Neurologic:  - neg neurologic ROS    (-) seizures and CVA   Cardiovascular:  - neg cardiovascular ROS      (-) hypertension, CAD, arrhythmias and valvular problems/murmurs   METS/Exercise Tolerance:     Hematologic: Comments: Lab Test        19                       0610          WBC          12.6*         HGB          10.5*         MCV          90            PLT          281            Lab Test        19                       0610          NA           138           POTASSIUM    3.9           CHLORIDE     107           CO2          21            BUN          6*            CR           0.57          ANIONGAP     10            DESI          8.5           GLC          64*          - neg hematologic  ROS       Musculoskeletal:  - neg musculoskeletal ROS       GI/Hepatic:     (+) GERD Asymptomatic on medication,       Renal/Genitourinary:  - ROS Renal section negative       Endo:  - neg endo ROS    (-) Type I DM, Type II DM, thyroid disease, chronic steroid usage and obesity   Psychiatric:  - neg psychiatric ROS       Infectious Disease:  - neg infectious disease ROS       Malignancy:      - no malignancy   Other:    (+) Possibly pregnant                         Physical Exam  Normal systems: cardiovascular, pulmonary and dental    Airway   Mallampati: III  TM distance: >3 FB  Neck ROM: full    Dental     Cardiovascular  "  Rhythm and rate: regular and normal  (-) no friction rub, no systolic click and no murmur    Pulmonary    breath sounds clear to auscultation(-) no rhonchi, no decreased breath sounds, no wheezes, no rales and no stridor            Lab Results   Component Value Date    WBC 12.6 (H) 03/17/2019    HGB 10.5 (L) 03/17/2019    HCT 32.8 (L) 03/17/2019     03/17/2019     03/17/2019    POTASSIUM 3.9 03/17/2019    CHLORIDE 107 03/17/2019    CO2 21 03/17/2019    BUN 6 (L) 03/17/2019    CR 0.57 03/17/2019    GLC 64 (L) 03/17/2019    DESI 8.5 03/17/2019    ALBUMIN 2.7 (L) 03/17/2019    PROTTOTAL 7.3 03/17/2019    ALT 13 03/17/2019    AST 16 03/17/2019    ALKPHOS 127 03/17/2019    BILITOTAL 0.3 03/17/2019    LIPASE 185 03/17/2019       Preop Vitals  BP Readings from Last 3 Encounters:   03/17/19 119/77    Pulse Readings from Last 3 Encounters:   No data found for Pulse      Resp Readings from Last 3 Encounters:   03/17/19 20    SpO2 Readings from Last 3 Encounters:   No data found for SpO2      Temp Readings from Last 1 Encounters:   03/17/19 99.6  F (37.6  C) (Oral)    Ht Readings from Last 1 Encounters:   03/17/19 1.549 m (5' 1\")      Wt Readings from Last 1 Encounters:   03/17/19 84.8 kg (187 lb)    Estimated body mass index is 35.33 kg/m  as calculated from the following:    Height as of this encounter: 1.549 m (5' 1\").    Weight as of this encounter: 84.8 kg (187 lb).       Anesthesia Plan      History & Physical Review  History and physical reviewed and following examination; no interval change.    ASA Status:  2 .    NPO Status:  > 8 hours    Plan for Spinal   PONV prophylaxis:  Ondansetron (or other 5HT-3) and Dexamethasone or Solumedrol       Postoperative Care  Postoperative pain management:  IV analgesics and Neuraxial analgesia.      Consents  Anesthetic plan, risks, benefits and alternatives discussed with:  Patient..                 Evan Garber MD                    .  "

## 2019-03-17 NOTE — PROVIDER NOTIFICATION
03/17/19 0800   Provider Notification   Provider Name/Title Dr. Miner   Method of Notification At Bedside   Request Evaluate in Person   Dr. Miner at bedside. SVE by MD fingertip/0/-3, mid position. Orders for bedside US placed (OB and abdominal). Orders for general surgery consult. Orders for 100 mcg IV Fentanyl now x1. POC reviewed with pt and her sister--all questions answered.

## 2019-03-17 NOTE — PLAN OF CARE
Orders from Dr. Miner for sterile dressing change for leaking dressing. Orders to continue IV Rocephin q24 hours at this time.

## 2019-03-17 NOTE — PROGRESS NOTES
"Data: [facility] presented to [facility]: 3/17/2019  5:59 AM.  Reason for maternal/fetal assessment is right sided back pain which started 1800 on 3/16/19. Patient reports pain is \"sharp.\" States she was walking around [facility] for a few hours when pain intensified. Patient was brought in via ambulance directly from [facility].  Patient is a .  Prenatal record reviewed. Pregnancy  has been complicated by gestational diabetes, diet controlled; takes glyburide. Postprandial .   Gestational Age 34w5d. VSS. Fetal movement present. Patient denies uterine contractions, leaking of vaginal fluid/rupture of membranes, vaginal bleeding, abdominal pain, pelvic pressure, headache, visual disturbances, epigastric or URQ pain, significant edema. Support persons are present.   Action: Verbal consent for EFM. Triage assessment completed. Bill of rights reviewed.  Response: Patient verbalized agreement with plan. Will contact Dr Yaritza Cuellar with update and further orders.  "

## 2019-03-17 NOTE — PROVIDER NOTIFICATION
19 0643   Provider Notification   Provider Name/Title Dr Cuellar   Method of Notification Phone   Request Evaluate - Remote   Dr Cuellar notified of [facility] patient. Arrival via ambulance from [facility]. Patient reports excruciating right sided back pain 9-10/10. Patient is a 35 yo   At 34 weeks 5 days GA. GDMA1. +CVA tenderness on right side. UA pending. Order received for 50 mg IV fentanyl STAT for pain. Will call and update when UA is resulted.

## 2019-03-18 LAB
GLUCOSE BLDC GLUCOMTR-MCNC: 112 MG/DL (ref 70–99)
HGB BLD-MCNC: 8.7 G/DL (ref 11.7–15.7)

## 2019-03-18 PROCEDURE — 40000083 ZZH STATISTIC IP LACTATION SERVICES 1-15 MIN

## 2019-03-18 PROCEDURE — 36415 COLL VENOUS BLD VENIPUNCTURE: CPT | Performed by: OBSTETRICS & GYNECOLOGY

## 2019-03-18 PROCEDURE — 12000000 ZZH R&B MED SURG/OB

## 2019-03-18 PROCEDURE — 00000146 ZZHCL STATISTIC GLUCOSE BY METER IP

## 2019-03-18 PROCEDURE — 25000128 H RX IP 250 OP 636: Performed by: OBSTETRICS & GYNECOLOGY

## 2019-03-18 PROCEDURE — 25000132 ZZH RX MED GY IP 250 OP 250 PS 637: Performed by: OBSTETRICS & GYNECOLOGY

## 2019-03-18 PROCEDURE — 85018 HEMOGLOBIN: CPT | Performed by: OBSTETRICS & GYNECOLOGY

## 2019-03-18 RX ORDER — NICOTINE POLACRILEX 4 MG
15-30 LOZENGE BUCCAL
Status: DISCONTINUED | OUTPATIENT
Start: 2019-03-18 | End: 2019-03-20 | Stop reason: HOSPADM

## 2019-03-18 RX ORDER — FERROUS SULFATE 325(65) MG
325 TABLET ORAL
Status: DISCONTINUED | OUTPATIENT
Start: 2019-03-18 | End: 2019-03-19

## 2019-03-18 RX ORDER — DEXTROSE MONOHYDRATE 25 G/50ML
25-50 INJECTION, SOLUTION INTRAVENOUS
Status: DISCONTINUED | OUTPATIENT
Start: 2019-03-18 | End: 2019-03-20 | Stop reason: HOSPADM

## 2019-03-18 RX ADMIN — ACETAMINOPHEN 975 MG: 325 TABLET, FILM COATED ORAL at 22:18

## 2019-03-18 RX ADMIN — OXYCODONE HYDROCHLORIDE 5 MG: 5 TABLET ORAL at 03:57

## 2019-03-18 RX ADMIN — OXYCODONE HYDROCHLORIDE 10 MG: 5 TABLET ORAL at 07:19

## 2019-03-18 RX ADMIN — KETOROLAC TROMETHAMINE 30 MG: 30 INJECTION, SOLUTION INTRAMUSCULAR; INTRAVENOUS at 03:38

## 2019-03-18 RX ADMIN — CEFTRIAXONE SODIUM 1 G: 1 INJECTION, POWDER, FOR SOLUTION INTRAMUSCULAR; INTRAVENOUS at 08:23

## 2019-03-18 RX ADMIN — OXYCODONE HYDROCHLORIDE 10 MG: 5 TABLET ORAL at 11:46

## 2019-03-18 RX ADMIN — DIPHENHYDRAMINE HYDROCHLORIDE 25 MG: 50 INJECTION, SOLUTION INTRAMUSCULAR; INTRAVENOUS at 03:47

## 2019-03-18 RX ADMIN — OXYCODONE HYDROCHLORIDE 5 MG: 5 TABLET ORAL at 20:25

## 2019-03-18 RX ADMIN — FERROUS SULFATE TAB 325 MG (65 MG ELEMENTAL FE) 325 MG: 325 (65 FE) TAB at 09:16

## 2019-03-18 RX ADMIN — ACETAMINOPHEN 975 MG: 325 TABLET, FILM COATED ORAL at 15:02

## 2019-03-18 RX ADMIN — KETOROLAC TROMETHAMINE 30 MG: 30 INJECTION, SOLUTION INTRAMUSCULAR; INTRAVENOUS at 09:16

## 2019-03-18 RX ADMIN — SIMETHICONE CHEW TAB 80 MG 80 MG: 80 TABLET ORAL at 15:02

## 2019-03-18 RX ADMIN — ACETAMINOPHEN 975 MG: 325 TABLET, FILM COATED ORAL at 07:19

## 2019-03-18 RX ADMIN — SENNOSIDES AND DOCUSATE SODIUM 2 TABLET: 8.6; 5 TABLET ORAL at 09:24

## 2019-03-18 NOTE — PLAN OF CARE
"Pt has stated her incisional pain is controlled well with pharmacological interventions, was using a t-pump earlier at shift, tolerated a full liquid diet, drinking fluids adequately; was complaining about itchiness, treated with benadryl iv and wet washcloths , reports relief; pumped twice at shift, no milk; pt stated she had low milk supply with previous children, reports \"no\" right flank pain,emptied 600 ml, urine is clear and yellow; perineal care is performed and changed pad, light to scant bleeding; plan of care is updated with pt, planning to visit her baby at NICU soon.  "

## 2019-03-18 NOTE — PROGRESS NOTES
[facility] Addendum    Pt received 2nd dose of Ceftriaxone this AM.  Urine C&S pending.  Since Pt is doing well and since Dx of UTI is questionable, I will discontinue Ceftriaxone.  Cx won't be back until tomorrow, but even if it is positive she should be able to be Rx'd with po Abx going forward.    Sha Caceres MD

## 2019-03-18 NOTE — LACTATION NOTE
LC visit. She has been pumping for her baby in NICU.  LC reviewed pump settings, frequency and duration of pumping, what to expect for output, and provided encouragement.  She is aware that NICU LC will also follow.  All questions answered.

## 2019-03-18 NOTE — PLAN OF CARE
Patient meeting expected goals this shift. Antibiotics given at beginning of shift. Using tylenol, Toradol, and oxycodone for pain. Patient walking to NICU this shift. Bello removed, still awaiting void after removal. Urine culture released. Tolerated regular diet. Patient started on oral iron due to lower hemoglobin. Dressing marked, small amount of drainage from incision. Education taught to patient and patient verbalized understanding.

## 2019-03-18 NOTE — PLAN OF CARE
Pt was up with the assistance of this writer, stood up and walked without dizziness to the w/c and transferred to see her baby at the NICU, tolerated well, came back, assisted back to bed.

## 2019-03-18 NOTE — PLAN OF CARE
Pt stable and meeting expected goals. VSS. Pt on bedrest overnight but has been up in and out of the wheelchair to visit baby in NICU. Pain managed with tylenol, toradol and oxycodone. Urine output is adequate. Incision has had a slight increase in drainage & has been monitored and marked. Continue to monitor.

## 2019-03-18 NOTE — PROGRESS NOTES
"[facility] Postop C/S Note    S:  Patient without complaints other than typical soreness.  Minimal lochia.  Flatus not passed, tolerating Clear liquids diet well.  Right lower flank pain has resolved.    O:  Blood pressure 103/62, pulse 88, temperature 98.2  F (36.8  C), temperature source Oral, resp. rate 18, height 1.549 m (5' 1\"), weight 84.8 kg (187 lb), SpO2 93 %, unknown if currently breastfeeding.            Intake/Output Summary (Last 24 hours) at 3/18/2019 0818  Last data filed at 3/18/2019 0336  Gross per 24 hour   Intake 4602 ml   Output 1669 ml   Net 2933 ml           Abdomen - Non-distended, Normoactive bowel sounds, soft, appropriately tender; Fundus firm, at umbilicus, nontender        Dressing/Incision - clean, dry, intact        Extremities - No calf tenderness    Hemoglobin   Date Value Ref Range Status   03/18/2019 8.7 (L) 11.7 - 15.7 g/dL Final   03/17/2019 10.5 (L) 11.7 - 15.7 g/dL Final   ]      A:   Postop Day# 1, s/p Repeat Classical C/S, Right Salpingectomy, Left Partial salpingectomy at 34w5d- doing well        Adequate urine output        Postop anemia - mild, no sx's.        Right Flank pain - resolved, appreciate urology input, UA yesterday did not get reflexed to Ur C&S, order placed now.  On Ceftriaxone empirically (Day #2)    P:  1)  Routine care        2)  Fe sulfate 325 mg daily when passing flatus        3)  Bello out, ambulate, adv diet        4)  Probable D/C in 2-3 days        5)  F/U on Urine C&S - if Cx neg, will discontinue Ceftriaxone        6)  Will hold off on stone protocol CT as pain has resolved.  If pain returns, will order.      Sha Caceres MD            "

## 2019-03-19 LAB
COPATH REPORT: NORMAL
ERYTHROCYTE [DISTWIDTH] IN BLOOD BY AUTOMATED COUNT: 15.7 % (ref 10–15)
HCT VFR BLD AUTO: 26.2 % (ref 35–47)
HGB BLD-MCNC: 8.2 G/DL (ref 11.7–15.7)
MCH RBC QN AUTO: 28.4 PG (ref 26.5–33)
MCHC RBC AUTO-ENTMCNC: 31.3 G/DL (ref 31.5–36.5)
MCV RBC AUTO: 91 FL (ref 78–100)
PLATELET # BLD AUTO: 187 10E9/L (ref 150–450)
RBC # BLD AUTO: 2.89 10E12/L (ref 3.8–5.2)
WBC # BLD AUTO: 8.1 10E9/L (ref 4–11)

## 2019-03-19 PROCEDURE — 85027 COMPLETE CBC AUTOMATED: CPT | Performed by: OBSTETRICS & GYNECOLOGY

## 2019-03-19 PROCEDURE — 25000132 ZZH RX MED GY IP 250 OP 250 PS 637: Performed by: OBSTETRICS & GYNECOLOGY

## 2019-03-19 PROCEDURE — 12000000 ZZH R&B MED SURG/OB

## 2019-03-19 PROCEDURE — 36415 COLL VENOUS BLD VENIPUNCTURE: CPT | Performed by: OBSTETRICS & GYNECOLOGY

## 2019-03-19 RX ORDER — SIMETHICONE 80 MG
80 TABLET,CHEWABLE ORAL 4 TIMES DAILY
Status: DISCONTINUED | OUTPATIENT
Start: 2019-03-19 | End: 2019-03-20 | Stop reason: HOSPADM

## 2019-03-19 RX ADMIN — IBUPROFEN 800 MG: 800 TABLET ORAL at 15:54

## 2019-03-19 RX ADMIN — IBUPROFEN 800 MG: 800 TABLET ORAL at 00:23

## 2019-03-19 RX ADMIN — IBUPROFEN 800 MG: 800 TABLET ORAL at 22:29

## 2019-03-19 RX ADMIN — SIMETHICONE CHEW TAB 80 MG 80 MG: 80 TABLET ORAL at 15:54

## 2019-03-19 RX ADMIN — OXYCODONE HYDROCHLORIDE 5 MG: 5 TABLET ORAL at 00:23

## 2019-03-19 RX ADMIN — FERROUS SULFATE TAB 325 MG (65 MG ELEMENTAL FE) 325 MG: 325 (65 FE) TAB at 09:51

## 2019-03-19 RX ADMIN — OXYCODONE HYDROCHLORIDE 5 MG: 5 TABLET ORAL at 06:44

## 2019-03-19 RX ADMIN — OXYCODONE HYDROCHLORIDE 5 MG: 5 TABLET ORAL at 09:51

## 2019-03-19 RX ADMIN — IBUPROFEN 800 MG: 800 TABLET ORAL at 09:51

## 2019-03-19 RX ADMIN — SIMETHICONE CHEW TAB 80 MG 80 MG: 80 TABLET ORAL at 10:03

## 2019-03-19 RX ADMIN — ACETAMINOPHEN 975 MG: 325 TABLET, FILM COATED ORAL at 06:44

## 2019-03-19 RX ADMIN — ACETAMINOPHEN 975 MG: 325 TABLET, FILM COATED ORAL at 15:54

## 2019-03-19 RX ADMIN — SENNOSIDES AND DOCUSATE SODIUM 2 TABLET: 8.6; 5 TABLET ORAL at 09:51

## 2019-03-19 RX ADMIN — SIMETHICONE CHEW TAB 80 MG 80 MG: 80 TABLET ORAL at 22:29

## 2019-03-19 NOTE — PLAN OF CARE
Pt stable and meeting expected goals. VSS. Up ad wicho and independent with cares. Pain managed with tylenol, ibuprofen and oxycodone. Pt declined pumping this shift but encouraged to. Pt down to visit baby in NICU this shift. Continue to monitor.

## 2019-03-19 NOTE — PROGRESS NOTES
Postpartum Progress Note  Cynthia Magana  [id number]    Subjective:   Patiet complains of worsening right flank pain today, now resolved. She had improvement of pain following c/s, but now that duramorph has worn off and she is taking fewer narcotics the pain has returned. She is also complaining of diffuse abdominal pain. She states the worst pain is in her epigastric region and improves with hot packs. Denies nausea and vomiting. Ambulating without difficulty. Adequate PO intake. + flatus, no BM.     Objective:  Vitals:    19 1715 19 0023 19 0644 19 1000   BP: 101/72 107/66  117/84   Pulse: 79 68  93   Resp:    Temp: 98.7  F (37.1  C) 98.7  F (37.1  C)  99  F (37.2  C)   TempSrc: Oral Oral  Oral   SpO2:       Weight:       Height:          General: resting comfortably, in NAD  Heart: regular rate, well perfused  Lungs: no increased work of breathing, no cough  Abdomen: soft, non distended, appropriately tender, FF at U -1  Incision:  Midline vertical incision with staples in place, clean and dry  Extremities: scant b/l edema, non-tender to palpation    I/O last 3 completed shifts:  In: -   Out: 200 [Urine:200]    Labs:  Hemoglobin   Date Value Ref Range Status   2019 8.7 (L) 11.7 - 15.7 g/dL Final   2019 10.5 (L) 11.7 - 15.7 g/dL Final       Assessment/Plan:  34 year old  who is POD#1 s/p repeat c/s for acute pain concerning for labor.  Currently stable and doing well  - Routine post-partum cares  - Heme: ABLA, repeat CBC today to evaluate hgb and for WBC with concern for infection vs renal lithiasis given R flank pain. Current epigastric pain is likely associated with gas, lower abdominal pain c/w post-op pain and is currently mild.  - Pain: continue tylenol, ibuprofen, ice packs, hot packs as needed. Encouraged ambulation and simethicone for gas pain.   - Opioids available as needed if the above treatments are inadequate.   - flank pain: small non-obstructing  stone in the renal pelvis on imaging. If return of severe pain not improved with the above regimen, consider CT for further evaluation  - Baby: in NICU  - Dispo: d/c to home POD#3    Sarai Denny MD  OB/GYN

## 2019-03-19 NOTE — PLAN OF CARE
Patient having right flank pain at beginning of shift, using tylenol, ibuprofen, and oxycodone, patient states better now. Patient also complaining of gas pains, encouraged to walk, and is now on scheduled simethicone. Oral iron stopped per Dr. Denny, due to patients gas pains. Patient taught education and patient verbalized understanding.

## 2019-03-19 NOTE — LACTATION NOTE
"This note was copied from a baby's chart.  DEEDEE spoke with Latoya in the NICU.  Feeding: Debi has recently been off resp support so has not orally fed  Mother reports she did not have succes getting milk with 3 siblings d/t discomfort after   Pumping: Latoya reports she will try to continue to pump. SHe is not getting\"anything\" I reviewed establishing milk supply time line and encouraged continued pumping for a few days to allow milk to be available for baby. We reviewed pump settings and pressure.  Plan: Continue to follow and support    "

## 2019-03-20 VITALS
DIASTOLIC BLOOD PRESSURE: 71 MMHG | SYSTOLIC BLOOD PRESSURE: 110 MMHG | BODY MASS INDEX: 35.3 KG/M2 | HEART RATE: 79 BPM | RESPIRATION RATE: 16 BRPM | TEMPERATURE: 98.4 F | WEIGHT: 187 LBS | OXYGEN SATURATION: 93 % | HEIGHT: 61 IN

## 2019-03-20 LAB
BACTERIA SPEC CULT: ABNORMAL
Lab: ABNORMAL
SPECIMEN SOURCE: ABNORMAL

## 2019-03-20 PROCEDURE — 40000083 ZZH STATISTIC IP LACTATION SERVICES 1-15 MIN

## 2019-03-20 PROCEDURE — 25000132 ZZH RX MED GY IP 250 OP 250 PS 637: Performed by: OBSTETRICS & GYNECOLOGY

## 2019-03-20 RX ORDER — NITROFURANTOIN 25; 75 MG/1; MG/1
100 CAPSULE ORAL 2 TIMES DAILY
Qty: 14 CAPSULE | Refills: 0 | Status: SHIPPED | OUTPATIENT
Start: 2019-03-20

## 2019-03-20 RX ORDER — FERROUS SULFATE 325(65) MG
325 TABLET ORAL
Qty: 30 TABLET | Refills: 0 | Status: SHIPPED | OUTPATIENT
Start: 2019-03-20

## 2019-03-20 RX ORDER — SIMETHICONE 80 MG
80 TABLET,CHEWABLE ORAL EVERY 6 HOURS PRN
Qty: 20 TABLET | Refills: 0 | Status: SHIPPED | OUTPATIENT
Start: 2019-03-20

## 2019-03-20 RX ORDER — OXYCODONE HYDROCHLORIDE 5 MG/1
5 TABLET ORAL
Qty: 30 TABLET | Refills: 0 | Status: SHIPPED | OUTPATIENT
Start: 2019-03-20

## 2019-03-20 RX ORDER — IBUPROFEN 800 MG/1
800 TABLET, FILM COATED ORAL EVERY 6 HOURS PRN
Qty: 30 TABLET | Refills: 1 | Status: SHIPPED | OUTPATIENT
Start: 2019-03-20

## 2019-03-20 RX ADMIN — IBUPROFEN 800 MG: 800 TABLET ORAL at 10:14

## 2019-03-20 RX ADMIN — OXYCODONE HYDROCHLORIDE 10 MG: 5 TABLET ORAL at 06:12

## 2019-03-20 RX ADMIN — SIMETHICONE CHEW TAB 80 MG 80 MG: 80 TABLET ORAL at 10:14

## 2019-03-20 RX ADMIN — SIMETHICONE CHEW TAB 80 MG 80 MG: 80 TABLET ORAL at 04:03

## 2019-03-20 RX ADMIN — ACETAMINOPHEN 975 MG: 325 TABLET, FILM COATED ORAL at 08:09

## 2019-03-20 RX ADMIN — OXYCODONE HYDROCHLORIDE 10 MG: 5 TABLET ORAL at 09:10

## 2019-03-20 RX ADMIN — OXYCODONE HYDROCHLORIDE 10 MG: 5 TABLET ORAL at 13:54

## 2019-03-20 RX ADMIN — IBUPROFEN 800 MG: 800 TABLET ORAL at 04:03

## 2019-03-20 RX ADMIN — SENNOSIDES AND DOCUSATE SODIUM 2 TABLET: 8.6; 5 TABLET ORAL at 08:09

## 2019-03-20 RX ADMIN — ACETAMINOPHEN 975 MG: 325 TABLET, FILM COATED ORAL at 00:45

## 2019-03-20 NOTE — DISCHARGE INSTRUCTIONS
Postop  Birth Instructions  [facility] lactation: [phone]    Activity       Do not lift more than 10 pounds for 6 weeks after surgery.  Ask family and friends for help when you need it.    No driving until you have stopped taking your pain medications (usually two weeks after surgery).    No heavy exercise or activity for 6 weeks.  Don't do anything that will put a strain on your surgery site.    Don't strain when using the toilet.  Your care team may prescribe a stool softener if you have problems with your bowel movements.     To care for your incision:       Keep the incision clean and dry.    Do not soak your incision in water. No swimming or hot tubs until it has fully healed. You may soak in the bathtub if the water level is below your incision.    Do not use peroxide, gel, cream, lotion, or ointment on your incision.    Adjust your clothes to avoid pressure on your surgery site (check the elastic in your underwear for example).     You may see a small amount of clear or pink drainage and this is normal.  Check with your health care provider:       If the drainage increases or has an odor.    If the incision reddens, you have swelling, or develop a rash.    If you have increased pain and the medicine we prescribed doesn't help.    If you have a fever above 100.4 F (38 C) with or without chills when placing thermometer under your tongue.   The area around your incision (surgery wound), will feel numb.  This is normal. The numbness should go away in less than a year.     Keep your hands clean:  Always wash your hands before touching your incision (surgery wound). This helps reduce your risk of infection. If your hands aren't dirty, you may use an alcohol hand-rub to clean your hands. Keep your nails clean and short.    Call your healthcare provider if you have any of these symptoms:       You soak a sanitary pad with blood within 1 hour, or you see blood clots larger than a golf  ball.    Bleeding that lasts more than 6 weeks.    Vaginal discharge that smells bad.    Severe pain, cramping or tenderness in your lower belly area.    A need to urinate more frequently (use the toilet more often), more urgently (use the toilet very quickly), or it burns when you urinate.    Nausea and vomiting.    Redness, swelling or pain around a vein in your leg.    Problems breastfeeding or a red or painful area on your breast.    Chest pain and cough or are gasping for air.    Problems with coping with sadness, anxiety or depression. If you have concerns about hurting yourself or the baby, call your provider immediately.      You have questions or concerns after you return home.              Post Partum Discharge Instructions after a  Section    What to expect:   No matter how well you feel when you get home, these first few days at home are for rest and recuperation. You can be surprised by how much rest it takes to fully recover from the delivery and how little extra time there is for tasks other than eating, sleeping and caring for your . Limit your activities to caring for yourself and your baby; eat, rest and relax. Let others clean, cook and supervise household needs (including older siblings). You will tire more quickly and this can be emotionally draining in a new situation without giving yourself a chance to recharge regularly.      Physical Changes:    Breast milk comes in within three to six days after delivery. When breast milk comes in, it is not unusual to get a low grade fever (max 101 F) and very hard, sensitive breasts with or without leaking.    Spotting or bleeding, like a period, on and off for six to eight weeks after delivery. Pattern of bleeding varies during the recovery period and will continue to vary with or without breastfeeding. Passing small to medium clots on occasion is also a normal experience.    Menstrual cramping may be more noticeable during and immediately  following breastfeeding.    Legs and feet often swell more after delivery than before delivery; this usually returns to normal within two weeks postpartum.    Constipation is a common complaint - this can lead to significant abdominal pain and cramping. Drink plenty of fluids, eat fresh fruit and vegetables, as well as plenty of fiber.    Breast care:  Breastfeeding:    Breastfeeding can be difficult; learning how to breastfeed takes time and patience.     Your milk should come in 3-6 days after delivery. If you are not drinking enough fluids or had an unusual amount of blood loss at delivery, it may take a few days longer.    During the first few days the baby is getting colostrum- this is high in antibodies which are good for the baby.    After nursing, remember to express a little breast milk and rub it on your nipples and allow them to air dry for 10-15 minutes after each feeding. You may also use pure lanolin or vitamin E oil or cream if you prefer.     If your breasts become engorged, try warm compresses or a warm shower, followed by feeding the baby or expressing milk to relieve the discomfort.    Bottle feeding:    Wear a tight fitting bra or bind your breasts with an ACE bandage. Ice packs will help relieve the soreness. Do not try to express the milk as this will only stimulate the production of more milk.    Emotions:    There are a wide range of emotions you may experience once you have gone home.    Many things will contribute to unusual feelings during the postpartum period:   -Feelings of anxiety about caring for your new baby   -Hormone changes   -Lack of sleep    Feelings of being overwhelmed, exhausted and out of your element are very normal. Be patient with yourself, this is a normal process of adjustment and you will get it with time.    You may be surprised at how fragile, alone, and overwhelmed you feel. About 70% of women have baby blues after childbirth. If you feel that your sadness is  progressing to extreme sadness to a point of being unable to care for yourself or your baby, you may have postpartum depression. Counseling with a qualified therapist can be very helpful. If you do not know who to call or where to turn, call your physician.    Postpartum pain control:  It is normal to have abdominal pain following surgery. The goal is to get your pain level to a 3/10, enabling you to walk around comfortably. You may experience cramping for up to 1-2 weeks, particularly while breast feeding.     -Start your day by taking up to 2-4 tabs of regular strength ibuprofen (400-800mg), continue every 6 hours as needed for pain.    -You can additionally take 1-2 tabs of tylenol (325-650mg regular strength or 500-1000mg extra strength), every 6 hours for additional pain control as needed. Take no more than 4,000 mg of tylenol in 24 hours.     - Narcotic pain medication is available for breakthrough pain, you may find that you need it every 3-6 hours or just at night. You can take 1-2 tabs of oxycodone, roxicodone, or norco as needed. If you are taking a formulation that contains tylenol (oxycodone, norco) do NOT take additional tylenol.     It is best to alternate your medications so you are taking something every 3 hours if you are having continued pain.    For example:  6am: ibuprofen 3 tabs (800 mg)   9am: tylenol 1000 mg  12pm: ibuprofen 800 mg  3pm: tylenol 1000 mg  6pm: ibuprofen 800 mg  9pm: tylenol 1000 mg  12am: ibuprofen 800 mg    Remember, you can use the oxycodone or other narcotic pain medication in between these times if needed for significant breakthrough pain that makes it hard to walk or function. This may be necessary for the first 3-7 days, especially.    If you have vaginal pain you can take sitz baths 1-2x/day. Fill the tub with 2-3 inches of warm water and soak the perineal and vaginal area for 10 minutes. This can be done 3-4 times a day. Ice or warm packs can also be applied for  comfort.      Activity:    Rest as much as possible, limit visitors and take frequent naps at first    Do not lift anything heavier than your baby plus carrier for 3-4 weeks after a vaginal delivery. No lifting > 20lb for 6 weeks after a  delivery.     Showers are fine, no baths for two weeks.    If your bleeding increases or is bright red, you are doing too much.       Walk and climb stairs as tolerated     Avoid exercising for at least three weeks after a vaginal delivery and six weeks after a . Progress activity slowly.    Driving is not recommended for the first two weeks after your delivery; more restriction may apply. You should never drive if you are taking prescription pain medication (oxycodone, norco).    Avoid intercourse for four to six weeks after delivery. Beware that severe vaginal dryness may cause significant discomfort with intercourse. This is normal, but can be unpleasant. Using Jelly/Astroglide/Replens or similar may be needed. Remember to use reliable contraception as pregnancy is possible (even when breastfeeding)!    Diet:    Drink at least eight glasses of water each day, especially if you are breastfeeding.    If breastfeeding, you will need 330kcal/day more than non-lactating women.     If not breastfeeding, you may resume your pre-pregnancy diet.    Continue taking in good calcium - at least four servings of calcium fortified foods per day.     Constipation:  You may use the following products to ease constipation:    1. Stool softeners such as metamucil or benefiber (over the counter)  2. Senna 1-2 times daily  3. Fiber supplements (over the counter)  4. Miralax (over the counter)  5. Colace  6. Dulcolax      Please be sure to keep adequately hydrated: 6-8 8oz glasses daily, more if needed to compensate for exercise, sweating, etc.      More specific dosing for constipation medications can be found below:    - Metamucil 28g daily PO with 8oz of water  - Senna-docusate  8.6-50 MG per tablet PO 1 tablet, Oral, 2 TIMES DAILY, Start with 1 tablet PO BID, reduce to 1 tablet daily when having daily BMs. Stop for loose stools.  - Docusate sodium (COLACE) capsule 100 mg, Oral, 2 TIMES DAILY. Stop for loose stools.  - Bisacodyl (DULCOLAX) suppository 10 mg, Rectal, DAILY PRN. Stop for loose stools.    Other Medications:  Keep taking your prenatal vitamin until you stop breastfeeding; if not breastfeeding, may stop one month after the delivery.  If you were on an iron supplement at the end of your pregnancy or after delivery, continue this for twelve weeks after delivery (or until prescription gone).    Staples:  Some patients have non-dissolving staples placed to close their incision. If you are discharged home with non-dissolving staples in place, please call your obstetrician's office to have them assessed for removal Monday 3/25/19 after discharge.    Stitches:    Your stitches will dissolve by themselves - both  and episiotomy/vaginal stitches.      Use the pericare bottle given to you in the hospital to rinse off your bottom after using the bathroom and gently pat dry with tissue.    For  incisions, keep the site clean and dry. If there are steri-strips on your incision, you may remove them one week after your surgery     You may shower as often as you desire after delivery - either a vaginal or a  delivery.    Keep the incision open to air; no bandage needs to be kept on the incision.    If there is a small amount of liquid drainage - clear, red, white - this is normal and may be seen now and then. If there is a large amount of  drainage, increasing pain or redness of the incision and/or fever, call the office to be seen.      WHEN TO CALL-      Fever: 100.4 degrees or higher      Nausea and vomiting       Frequent and painful urination       Bleeding heavier than 1 pad per hour                                Open area of  incision                          Increasing pelvic/abdominal pain not controlled with your medications      Red, tender, painful area on the breast       Persistent perineal pain with increasing intensity                         Foul smelling discharge (increased volume of discharge is normal)      Pain, swelling, and tenderness in leg       Chest pain and cough       Severe headache, especially with visual changes    Please contact the clinic with any questions.  Please schedule a follow up appointment with your primary OB/Gyn provider in 2 weeks or sooner if you have any problems.   You can contact the clinic via [employer] or call [place] at [phone].    Schedule your well baby visit as directed by the pediatrician or family practice physician.

## 2019-03-20 NOTE — PLAN OF CARE
Pt got her filled Rx's, discharging to bed and bord status and will be transferring to room # 453 on same unit, no further questions.

## 2019-03-20 NOTE — DISCHARGE SUMMARY
[facility] Discharge Summary    Cynthia Magana MRN# [id number]   Age: 34 year old YOB: 1985     Date of Admission:  3/17/2019  Date of Discharge::  3/19/2019  Admitting Physician:  Elaine Miner DO  Discharge Physician:  Elaine Miner DO     Home Clinic:  [facility]          Admission Diagnoses:    pregnancy @ 34w5d, History of  section x3, Prior classical, Prior vertical abdominal incision due to h/o extensive adhesions, RUQ abdominal and flank pain, Active labor, Elective sterilization  GDMA2          Discharge Diagnosis:   Same  S/p Repeat classical  section via vertical midline skin incision  Right salpingectomy  Partial left salpingectomyExtensive lysis of omental and uterine adhesions  GDMA2  Anemia of acute blood loss  Ureteropelvic kidney stone  Urinary Tract Infection            Procedures:   Procedure(s):        Repeat classical  section via vertical midline skin incision  Right salpingectomy  Partial left salpingectomy  Lysis of extensive adhesions             Medications Prior to Admission:     No medications prior to admission.             Discharge Medications:     Current Discharge Medication List      START taking these medications    Details   ferrous sulfate (FEROSUL) 325 (65 Fe) MG tablet Take 1 tablet (325 mg) by mouth daily (with breakfast)  Qty: 30 tablet, Refills: 0    Associated Diagnoses: S/P       ibuprofen (ADVIL/MOTRIN) 800 MG tablet Take 1 tablet (800 mg) by mouth every 6 hours as needed for other (cramping)  Qty: 30 tablet, Refills: 1    Associated Diagnoses: S/P       nitroFURantoin macrocrystal-monohydrate (MACROBID) 100 MG capsule Take 1 capsule (100 mg) by mouth 2 times daily  Qty: 14 capsule, Refills: 0    Associated Diagnoses: Acute cystitis without hematuria      oxyCODONE (ROXICODONE) 5 MG tablet Take 1 tablet (5 mg) by mouth every 3 hours as needed for moderate to severe pain  Qty: 30 tablet, Refills:  0    Associated Diagnoses: S/P       simethicone (MYLICON) 80 MG chewable tablet Take 1 tablet (80 mg) by mouth every 6 hours as needed for other (gas)  Qty: 20 tablet, Refills: 0    Associated Diagnoses: S/P                    Consultations:   General Surgery  Urology          Brief History of Labor:   Sandeep is a  who presented at 34 5/7 weeks gestation.     Patient presented with a chief complaint of right flank pain that developed into right upper quadrant abdominal pain. UA showed a possible UTI and she was started on Rocephin IV. An abdominal ultrasound was non-contributory. An abdominal MRI showed a normal appendix, possible nephrolithiasis at the right ureteropelvic junction. Her pain was difficult to manage with IV pain medications and she began to feel regular contractions. Her cervix changed from closed to fingertip to 1cm. Due to active labor with a history of 3 previous  sections including most recently in 2016, a classical  section via vertical midline skin incision, the decision was made to proceed with delivery by repeat classical  section. The patient had previously requested bilateral tubal ligation and continued to desire sterlization.  The case was also discussed with Urology who gave recommendations for CT postpartum if the patient's right upper quadrant/flank pain continues after delivery; possible stent placement. The patient did receive Betamethasone x1 prior to delivery.             Hospital Course:   The  was delivered via a Repeat classical  section via vertical midline skin incision. Surgery included a Right salpingectomy & Partial left salpingectomy for sterilization as well as lysis of extensive omental and uterine adhesions.    The patient's hospital course was unremarkable.  She recovered as anticipated and experienced no post-operative complications. On discharge, her pain was well controlled. Vaginal bleeding is  similar to peak menstrual flow.  Voiding without difficulty.  Ambulating well and tolerating a normal diet.  No fever or significant wound drainage.  Infant is stable in NICU.  She has had a bowel movement.  She was discharged on post-partum day #3 with a plan to board. Her RUQ and flank pain is currently not present. If it returns, consider CT stone protocol, possible stent.     Post-partum hemoglobin:   Hemoglobin   Date Value Ref Range Status   2019 8.2 (L) 11.7 - 15.7 g/dL Final              Discharge Instructions and Follow-Up:   Discharge diet: Advance to a regular diet as tolerated, low in simple carbohydrates due to gestational diabetes   Discharge activity: No lifting, driving, or strenuous exercise for 6 week(s)   Discharge follow-up: Follow up fpr staple removal on Monday, Dr. Miner in 2 weeks and 6 weeks   Wound care: Staples out in 3-5 days           Discharge Disposition:   Discharged to home, plans to board.  in NICU.        Elaine Miner, DO

## 2019-03-20 NOTE — CONSULTS
[facility]  MATERNAL CHILD HEALTH   INITIAL NICU PSYCHOSOCIAL ASSESSMENT     DATA:     Reason for Social Work Consult: NICU admission of  34.5 weeks gestation for respiratory distress.    Presenting Information: SAMRA met with Cynthia who is  to Desi Lewis. They reside with his parents in [place]. The couple have 3 other children Edgar 8, Charu 5 and Maida 2. Desi had 3 sons from his first marriage ages 18, 16 and 12 who the family see EOW.    Cynthia reports that she and her family where in [place] at the [facility] for her mother-in-laws birthday when she went into labor. Her 2 older children were also in the NICU.    Social Support: Close family support. Cynthia reports that in her culture the family don't let her do anything the first month after birth. Her MIL is calling her often to tell her to lay down and not walk if she's not up to it.    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  Insurance: [employer] [facility] contacted her insurance re: considering transferring baby to [facility] to be closer to baby. She reports her insurance would not pay for transport.      Mental Health History: Cynthia denies history of anxiety/depression. She scored Zero on EPDS.    History of Postpartum Mood Disorders: None    Community Resources//Baby Supplies:  Family is prepared for baby at home and are on WIC.      INTERVENTION:       SAMRA completed chart review and collaborated with the multidisciplinary team.     Psychosocial Assessment     Introduction to Maternal Child Health  role and scope of practice     Discussed NICU experience and gave NICU welcome card    Reviewed Hospital and Community Resources     Assessed Chemical Health History and Current Symptoms     Assessed Mental Health History and Current Symptoms     Identified stressors, barriers and family concerns     Provided support and active empathetic listening and  validation.     Provided psychoeducation on  mood and anxiety disorders, assessed for any current symptoms or history    Provided brochure Depression and Anxiety During and after Pregnancy.     ASSESSMENT:     Coping: Very well    Affect: appropriate and calm with good eye contact.    Motivation/Ability to Access Services: Independent in accessing services.  Assessment of Support System: stable and involved  Level of engagement with SW: Engaged and appropriate. Able to seek out SW when needs arise.     Family s understanding of baby s medical situation: good grasp of the medical situation    Family and parent/infant interactions: FOB is home with other  Children at this time. MOB is watching baby on monitor and plans to see baby soon. She has been bonding well with baby.    Assessment of parental risk for PPMD:Low    Strengths:  caring family, willingness to accept help    Identified Barriers: distance to home, otherwise no other barriers.    PLAN:     SW will continue to follow family while baby is in the NICU.

## 2019-03-20 NOTE — PLAN OF CARE
Pt's pain is controlled with pharmacological interventions and t-pump/warm packs; went over discharge education, answered questions, needs to follow up on Monday for staples removal, in 2 and 6 weeks with OB dr, seen by lactation specialist today and watched hand expression video; awaiting for medications to be filled, planning to discharge on B and B, set of rules is given to pt.

## 2019-03-20 NOTE — PLAN OF CARE
VSS, Bonding well with baby in the NICU via I-Pad and visiting the NICU. Pain is well controlled with tylenol and ibuprofen. Patient had gas pain earlier in the shift. Main pain source this shift was the upper abdomen.  Abdominal massage performed, pt stated it helped with the discomfort. Encouraged to ambulate as tolerated. Pt able to pass gas and felt relief. Patient is voiding without difficulty and ambulating independently. Urine strained, no stones collected.Tolerating regular diet well. Independent in self and baby cares. Encouraged mom to pump and or use hand expression every 2-3 hours to increase supply. Mom stated she would pump later.

## 2019-03-20 NOTE — PROGRESS NOTES
Public Health Nurse (PHN) met with patient, discussed resources within [facility].  Provided family resources of [facility] Public Health services resource card, home visiting card, community resource guide and car seat information card given and discussed.  PHN explained how to self refer to [facility]. Patient declined any questions or concerns.

## 2019-03-20 NOTE — PROGRESS NOTES
[facility]    Obstetrics Post-Op / Progress Note    Assessment & Plan   Assessment:  -3 Days Post-Op  Procedure(s):  Repeat classical  section via vertical midline skin incision  Right salpingectomy  Partial left salpingectomy  Lysis of extensive adhesions    Anemia of acute blood loss  Ureteropelvic kidney stone  Urinary Tract Infection    Plan:  Urine culture resulted positive, discharge home on oral antibiotics - Nitrofurantoin 100mg BID x 5 d  Continue Ibuprofen, Tylenol, Oxycodone for pain  Continue Simethicone   Hgb 8.2, Rx iron   Discharge today   in NICU, may board. Patient states she is discussing having her baby transfered to NICU in [place] to be closer to home.    [facility] DO    Interval History   Doing well.  Pain is well-controlled.  Had warm packs on her abdomen. No fevers.  No history of wound drainage, warmth or significant erythema.  Good appetite.  Denies chest pain, shortness of breath, nausea or vomiting. Passed bowel movement this AM. + Flatus.  Ambulatory.   in NICU.      Medications     dextrose 5% lactated ringers 125 mL/hr at 19     NO Rho (D) immune globulin (RhoGam) needed - mother Rh POSITIVE       oxytocin in 0.9% NaCl 100 mL/hr (19 1612)     oxytocin in 0.9% NaCl         acetaminophen  975 mg Oral Q8H     measles, mumps and rubella vaccine  0.5 mL Subcutaneous Once     simethicone  80 mg Oral 4x Daily     Tdap (tetanus-diphtheria-acell pertussis)  0.5 mL Intramuscular Once       Physical Exam   Temp: 98.9  F (37.2  C) Temp src: Oral BP: 115/74 Pulse: 90   Resp: 16        Vitals:    19 0615   Weight: 84.8 kg (187 lb)     Vital Signs with Ranges  Temp:  [98  F (36.7  C)-99  F (37.2  C)] 98.9  F (37.2  C)  Pulse:  [75-93] 90  Resp:  [16-18] 16  BP: (109-121)/(73-84) 115/74  I/O last 3 completed shifts:  In: -   Out: 875 [Urine:875]    Uterine fundus is firm, non-tender and at the level of the umbilicus  Vertical incision  C/D/I, staples in place  Extremities Non-tender  Heart is regular rate and rhythm and lungs clear to auscultation    Data   Recent Labs   Lab Test 03/17/19  0610   ABO B   RH Pos   AS Neg     Recent Labs   Lab Test 03/19/19  1218 03/18/19  0640   HGB 8.2* 8.7*     Recent Labs   Lab Test 09/24/18   RUQIGG immune

## 2019-03-20 NOTE — PLAN OF CARE
Patient vital signs stable and meeting expected outcomes.  Encouraged pt to pump for infant in NICU; stated she would pump in the morning.  Up independently and voiding adequately.  Urine strained, no stones collected.  Pain well controlled with ytlenol and ibuprofen.  Also using simethicone to help with gas pain.  Pt was able to have a BM and pass gas with some relief in pain.  Incision WDL, staples intact.  Able to perform all cares for self.  Plan to visit infant this morning.  Plan to discharge to bed and board.  Will continue to monitor.

## 2019-03-20 NOTE — LACTATION NOTE
Lactation visit. Mom is lying in bed watching her babies on the computer screen. Discussed pumping and Mom instructed to double pump along with hand expression q 3 hours for baby in NICU. Mom shown hand expression video.  Demonstrated the technique and mom returned demo successfully.

## 2019-03-21 LAB
BLD PROD TYP BPU: NORMAL
BLD PROD TYP BPU: NORMAL
BLD UNIT ID BPU: 0
BLD UNIT ID BPU: 0
BLOOD PRODUCT CODE: NORMAL
BLOOD PRODUCT CODE: NORMAL
BPU ID: NORMAL
BPU ID: NORMAL
TRANSFUSION STATUS PATIENT QL: NORMAL

## 2023-12-12 ENCOUNTER — ALLIED HEALTH/NURSE VISIT (OUTPATIENT)
Dept: FAMILY MEDICINE | Facility: CLINIC | Age: 38
End: 2023-12-12
Payer: COMMERCIAL

## 2023-12-12 DIAGNOSIS — Z23 NEED FOR PROPHYLACTIC VACCINATION AND INOCULATION AGAINST INFLUENZA: Primary | ICD-10-CM

## 2023-12-12 PROCEDURE — 99207 PR NO CHARGE NURSE ONLY: CPT

## 2023-12-12 PROCEDURE — 90471 IMMUNIZATION ADMIN: CPT

## 2023-12-12 PROCEDURE — 90686 IIV4 VACC NO PRSV 0.5 ML IM: CPT

## 2025-01-27 ENCOUNTER — LAB REQUISITION (OUTPATIENT)
Dept: LAB | Facility: HOSPITAL | Age: 40
End: 2025-01-27

## 2025-01-27 PROCEDURE — 36415 COLL VENOUS BLD VENIPUNCTURE: CPT

## 2025-01-27 PROCEDURE — 86481 TB AG RESPONSE T-CELL SUSP: CPT

## 2025-01-28 LAB
QUANTIFERON MITOGEN: 10 IU/ML
QUANTIFERON NIL TUBE: 0.13 IU/ML
QUANTIFERON TB1 TUBE: 0.13 IU/ML
QUANTIFERON TB2 TUBE: 0.19

## 2025-01-29 LAB
GAMMA INTERFERON BACKGROUND BLD IA-ACNC: 0.13 IU/ML
M TB IFN-G BLD-IMP: NEGATIVE
M TB IFN-G CD4+ BCKGRND COR BLD-ACNC: 9.87 IU/ML
MITOGEN IGNF BCKGRD COR BLD-ACNC: 0 IU/ML
MITOGEN IGNF BCKGRD COR BLD-ACNC: 0.06 IU/ML

## (undated) DEVICE — BLADE CLIPPER SGL USE 9680

## (undated) DEVICE — GLOVE PROTEXIS BLUE W/NEU-THERA 6.5  2D73EB65

## (undated) DEVICE — LINEN TOWEL PACK X10 5473

## (undated) DEVICE — SU PDS II 0 CTX 60" Z990G

## (undated) DEVICE — SU DERMABOND ADVANCED .7ML DNX6

## (undated) DEVICE — CATH TRAY FOLEY SURESTEP 16FR DRAIN BAG STATOCK A899916

## (undated) DEVICE — CAP BABY PINK/BLUE IC-2

## (undated) DEVICE — LINEN HALF SHEET 5512

## (undated) DEVICE — PREP CHLORAPREP 26ML TINTED ORANGE  260815

## (undated) DEVICE — TRANSFER DEVICE BLOOD NDL HOLDER 364880

## (undated) DEVICE — BAG CLEAR TRASH 1.3M 39X33" P4040C

## (undated) DEVICE — SOL WATER IRRIG 1000ML BOTTLE 2F7114

## (undated) DEVICE — PACK C-SECTION LF PL15OTA83B

## (undated) DEVICE — LINEN BABY BLANKET 5434

## (undated) DEVICE — GLOVE PROTEXIS W/NEU-THERA 6.5  2D73TE65

## (undated) DEVICE — GLOVE PROTEXIS W/NEU-THERA 6.0  2D73TE60

## (undated) DEVICE — SU VICRYL 0 CT-1 36" J346H

## (undated) DEVICE — ESU LIGASURE OPEN SEALER/DIVIDER SM JAW 16.5MM LF1212A

## (undated) DEVICE — SU MONOCRYL 4-0 PS-2 27" UND Y426H

## (undated) DEVICE — SOL NACL 0.9% IRRIG 1000ML BOTTLE 2F7124

## (undated) DEVICE — SUCTION CANISTER MEDIVAC LINER 3000ML W/LID 65651-530

## (undated) DEVICE — SU VICRYL 2-0 CT-1 36" J345H

## (undated) DEVICE — SOLIDIFIER FLUID RED 1500ML LIQUID KIT SYS POWDER ISOB1500

## (undated) DEVICE — LINEN DRAPE 54X72" 5467

## (undated) DEVICE — PAD CHUX UNDERPAD 30X36" P3036C

## (undated) DEVICE — STPL SKIN PROXIMATE 35 WIDE PMW35

## (undated) DEVICE — ESU GROUND PAD ADULT W/CORD E7507

## (undated) DEVICE — STOCKING SLEEVE VASOPRESS COMPRESSION CALF MED 18" VP501M

## (undated) DEVICE — SU MONOCRYL 0 CT-1 36" UND Y946H

## (undated) DEVICE — LINEN FULL SHEET 5511

## (undated) RX ORDER — OXYTOCIN/0.9 % SODIUM CHLORIDE 30/500 ML
PLASTIC BAG, INJECTION (ML) INTRAVENOUS
Status: DISPENSED
Start: 2019-03-17

## (undated) RX ORDER — PHENYLEPHRINE HCL IN 0.9% NACL 1 MG/10 ML
SYRINGE (ML) INTRAVENOUS
Status: DISPENSED
Start: 2019-03-17

## (undated) RX ORDER — FENTANYL CITRATE 50 UG/ML
INJECTION, SOLUTION INTRAMUSCULAR; INTRAVENOUS
Status: DISPENSED
Start: 2019-03-17

## (undated) RX ORDER — MORPHINE SULFATE 1 MG/ML
INJECTION, SOLUTION EPIDURAL; INTRATHECAL; INTRAVENOUS
Status: DISPENSED
Start: 2019-03-17

## (undated) RX ORDER — ONDANSETRON 2 MG/ML
INJECTION INTRAMUSCULAR; INTRAVENOUS
Status: DISPENSED
Start: 2019-03-17

## (undated) RX ORDER — GLYCOPYRROLATE 0.2 MG/ML
INJECTION INTRAMUSCULAR; INTRAVENOUS
Status: DISPENSED
Start: 2019-03-17

## (undated) RX ORDER — DEXAMETHASONE SODIUM PHOSPHATE 4 MG/ML
INJECTION, SOLUTION INTRA-ARTICULAR; INTRALESIONAL; INTRAMUSCULAR; INTRAVENOUS; SOFT TISSUE
Status: DISPENSED
Start: 2019-03-17

## (undated) RX ORDER — KETAMINE HCL IN 0.9 % NACL 50 MG/5 ML
SYRINGE (ML) INTRAVENOUS
Status: DISPENSED
Start: 2019-03-17